# Patient Record
Sex: FEMALE | Race: BLACK OR AFRICAN AMERICAN | NOT HISPANIC OR LATINO | Employment: FULL TIME | URBAN - METROPOLITAN AREA
[De-identification: names, ages, dates, MRNs, and addresses within clinical notes are randomized per-mention and may not be internally consistent; named-entity substitution may affect disease eponyms.]

---

## 2017-01-03 ENCOUNTER — GENERIC CONVERSION - ENCOUNTER (OUTPATIENT)
Dept: OTHER | Facility: OTHER | Age: 27
End: 2017-01-03

## 2017-01-03 ENCOUNTER — APPOINTMENT (OUTPATIENT)
Dept: PERINATAL CARE | Facility: CLINIC | Age: 27
End: 2017-01-03
Payer: COMMERCIAL

## 2017-01-03 ENCOUNTER — ALLSCRIPTS OFFICE VISIT (OUTPATIENT)
Dept: PERINATAL CARE | Facility: CLINIC | Age: 27
End: 2017-01-03
Payer: COMMERCIAL

## 2017-01-03 PROCEDURE — 76815 OB US LIMITED FETUS(S): CPT | Performed by: OBSTETRICS & GYNECOLOGY

## 2017-01-03 PROCEDURE — 59025 FETAL NON-STRESS TEST: CPT | Performed by: OBSTETRICS & GYNECOLOGY

## 2017-01-06 ENCOUNTER — ALLSCRIPTS OFFICE VISIT (OUTPATIENT)
Dept: OTHER | Facility: OTHER | Age: 27
End: 2017-01-06

## 2017-01-09 ENCOUNTER — GENERIC CONVERSION - ENCOUNTER (OUTPATIENT)
Dept: OTHER | Facility: OTHER | Age: 27
End: 2017-01-09

## 2017-01-09 ENCOUNTER — ALLSCRIPTS OFFICE VISIT (OUTPATIENT)
Dept: PERINATAL CARE | Facility: CLINIC | Age: 27
End: 2017-01-09
Payer: COMMERCIAL

## 2017-01-09 PROCEDURE — 76815 OB US LIMITED FETUS(S): CPT | Performed by: OBSTETRICS & GYNECOLOGY

## 2017-01-09 PROCEDURE — 59025 FETAL NON-STRESS TEST: CPT | Performed by: OBSTETRICS & GYNECOLOGY

## 2017-01-13 ENCOUNTER — ALLSCRIPTS OFFICE VISIT (OUTPATIENT)
Dept: OTHER | Facility: OTHER | Age: 27
End: 2017-01-13

## 2017-01-17 ENCOUNTER — ALLSCRIPTS OFFICE VISIT (OUTPATIENT)
Dept: PERINATAL CARE | Facility: CLINIC | Age: 27
End: 2017-01-17
Payer: COMMERCIAL

## 2017-01-17 ENCOUNTER — GENERIC CONVERSION - ENCOUNTER (OUTPATIENT)
Dept: OTHER | Facility: OTHER | Age: 27
End: 2017-01-17

## 2017-01-17 PROCEDURE — 59025 FETAL NON-STRESS TEST: CPT | Performed by: OBSTETRICS & GYNECOLOGY

## 2017-01-17 PROCEDURE — 76815 OB US LIMITED FETUS(S): CPT | Performed by: OBSTETRICS & GYNECOLOGY

## 2017-01-20 ENCOUNTER — ALLSCRIPTS OFFICE VISIT (OUTPATIENT)
Dept: OTHER | Facility: OTHER | Age: 27
End: 2017-01-20

## 2017-01-24 ENCOUNTER — APPOINTMENT (OUTPATIENT)
Dept: PERINATAL CARE | Facility: CLINIC | Age: 27
End: 2017-01-24
Payer: COMMERCIAL

## 2017-01-24 ENCOUNTER — ALLSCRIPTS OFFICE VISIT (OUTPATIENT)
Dept: PERINATAL CARE | Facility: CLINIC | Age: 27
End: 2017-01-24
Payer: COMMERCIAL

## 2017-01-24 ENCOUNTER — GENERIC CONVERSION - ENCOUNTER (OUTPATIENT)
Dept: OTHER | Facility: OTHER | Age: 27
End: 2017-01-24

## 2017-01-24 PROCEDURE — 76815 OB US LIMITED FETUS(S): CPT | Performed by: OBSTETRICS & GYNECOLOGY

## 2017-01-24 PROCEDURE — 59025 FETAL NON-STRESS TEST: CPT | Performed by: OBSTETRICS & GYNECOLOGY

## 2017-01-27 ENCOUNTER — ALLSCRIPTS OFFICE VISIT (OUTPATIENT)
Dept: PERINATAL CARE | Facility: CLINIC | Age: 27
End: 2017-01-27
Payer: COMMERCIAL

## 2017-01-27 ENCOUNTER — GENERIC CONVERSION - ENCOUNTER (OUTPATIENT)
Dept: OTHER | Facility: OTHER | Age: 27
End: 2017-01-27

## 2017-01-27 ENCOUNTER — LAB REQUISITION (OUTPATIENT)
Dept: LAB | Facility: HOSPITAL | Age: 27
End: 2017-01-27
Payer: COMMERCIAL

## 2017-01-27 ENCOUNTER — ALLSCRIPTS OFFICE VISIT (OUTPATIENT)
Dept: OTHER | Facility: OTHER | Age: 27
End: 2017-01-27

## 2017-01-27 DIAGNOSIS — O24.414 INSULIN CONTROLLED GESTATIONAL DIABETES MELLITUS DURING PREGNANCY: ICD-10-CM

## 2017-01-27 DIAGNOSIS — Z34.90 ENCOUNTER FOR SUPERVISION OF NORMAL PREGNANCY: ICD-10-CM

## 2017-01-27 DIAGNOSIS — O09.293 SUPERVISION OF PREGNANCY WITH OTHER POOR REPRODUCTIVE OR OBSTETRIC HISTORY, THIRD TRIMESTER: ICD-10-CM

## 2017-01-27 PROCEDURE — 87653 STREP B DNA AMP PROBE: CPT | Performed by: OBSTETRICS & GYNECOLOGY

## 2017-01-27 PROCEDURE — 76816 OB US FOLLOW-UP PER FETUS: CPT | Performed by: OBSTETRICS & GYNECOLOGY

## 2017-01-29 LAB — GP B STREP DNA SPEC QL NAA+PROBE: NORMAL

## 2017-01-31 ENCOUNTER — GENERIC CONVERSION - ENCOUNTER (OUTPATIENT)
Dept: OTHER | Facility: OTHER | Age: 27
End: 2017-01-31

## 2017-01-31 ENCOUNTER — ALLSCRIPTS OFFICE VISIT (OUTPATIENT)
Dept: PERINATAL CARE | Facility: CLINIC | Age: 27
End: 2017-01-31
Payer: COMMERCIAL

## 2017-01-31 PROCEDURE — 59025 FETAL NON-STRESS TEST: CPT | Performed by: OBSTETRICS & GYNECOLOGY

## 2017-01-31 PROCEDURE — 76815 OB US LIMITED FETUS(S): CPT | Performed by: OBSTETRICS & GYNECOLOGY

## 2017-02-03 ENCOUNTER — ALLSCRIPTS OFFICE VISIT (OUTPATIENT)
Dept: OTHER | Facility: OTHER | Age: 27
End: 2017-02-03

## 2017-02-07 ENCOUNTER — ALLSCRIPTS OFFICE VISIT (OUTPATIENT)
Dept: PERINATAL CARE | Facility: CLINIC | Age: 27
End: 2017-02-07
Payer: COMMERCIAL

## 2017-02-07 ENCOUNTER — GENERIC CONVERSION - ENCOUNTER (OUTPATIENT)
Dept: OTHER | Facility: OTHER | Age: 27
End: 2017-02-07

## 2017-02-07 PROCEDURE — 59025 FETAL NON-STRESS TEST: CPT | Performed by: OBSTETRICS & GYNECOLOGY

## 2017-02-07 PROCEDURE — 76815 OB US LIMITED FETUS(S): CPT | Performed by: OBSTETRICS & GYNECOLOGY

## 2017-02-10 ENCOUNTER — ALLSCRIPTS OFFICE VISIT (OUTPATIENT)
Dept: OTHER | Facility: OTHER | Age: 27
End: 2017-02-10

## 2017-02-11 ENCOUNTER — ANESTHESIA EVENT (INPATIENT)
Dept: LABOR AND DELIVERY | Facility: HOSPITAL | Age: 27
End: 2017-02-11
Payer: COMMERCIAL

## 2017-02-11 ENCOUNTER — ANESTHESIA (INPATIENT)
Dept: LABOR AND DELIVERY | Facility: HOSPITAL | Age: 27
End: 2017-02-11
Payer: COMMERCIAL

## 2017-02-11 ENCOUNTER — HOSPITAL ENCOUNTER (INPATIENT)
Facility: HOSPITAL | Age: 27
LOS: 2 days | Discharge: HOME | End: 2017-02-13
Attending: OBSTETRICS & GYNECOLOGY | Admitting: OBSTETRICS & GYNECOLOGY
Payer: COMMERCIAL

## 2017-02-11 DIAGNOSIS — Z3A.38 38 WEEKS GESTATION OF PREGNANCY: Primary | ICD-10-CM

## 2017-02-11 LAB
ABO GROUP BLD: NORMAL
BASE EXCESS BLDCOA CALC-SCNC: -4.6 MMOL/L (ref 3–11)
BASE EXCESS BLDCOV CALC-SCNC: -3.9 MMOL/L (ref 1–9)
BLD GP AB SCN SERPL QL: NEGATIVE
ERYTHROCYTE [DISTWIDTH] IN BLOOD BY AUTOMATED COUNT: 15.6 % (ref 11.6–15.1)
GLUCOSE SERPL-MCNC: 62 MG/DL (ref 65–140)
GLUCOSE SERPL-MCNC: 66 MG/DL (ref 65–140)
GLUCOSE SERPL-MCNC: 72 MG/DL (ref 65–140)
GLUCOSE SERPL-MCNC: 80 MG/DL (ref 65–140)
HCO3 BLDCOA-SCNC: 21.8 MMOL/L (ref 17.3–27.3)
HCO3 BLDCOV-SCNC: 21.2 MMOL/L (ref 12.2–28.6)
HCT VFR BLD AUTO: 34.6 % (ref 34.8–46.1)
HGB BLD-MCNC: 11.1 G/DL (ref 11.5–15.4)
HIV 1+2 AB+HIV1 P24 AG SERPL QL IA: NORMAL
HIV1 P24 AG SER QL: NORMAL
MCH RBC QN AUTO: 23.7 PG (ref 26.8–34.3)
MCHC RBC AUTO-ENTMCNC: 32.1 G/DL (ref 31.4–37.4)
MCV RBC AUTO: 74 FL (ref 82–98)
O2 CT VFR BLDCOA CALC: 10 ML/DL
OXYHGB MFR BLDCOA: 41.9 %
OXYHGB MFR BLDCOV: 71.5 %
PCO2 BLDCOA: 44.7 MM[HG] (ref 30–60)
PCO2 BLDCOV: 38.8 MM HG (ref 27–43)
PH BLDCOA: 7.31 [PH] (ref 7.23–7.43)
PH BLDCOV: 7.36 [PH] (ref 7.19–7.49)
PLATELET # BLD AUTO: 250 THOUSANDS/UL (ref 149–390)
PMV BLD AUTO: 11.7 FL (ref 8.9–12.7)
PO2 BLDCOA: 19.3 MM HG (ref 5–25)
PO2 BLDCOV: 30.4 MM HG (ref 15–45)
RBC # BLD AUTO: 4.69 MILLION/UL (ref 3.81–5.12)
RH BLD: POSITIVE
SAO2 % BLDCOV: 17.2 ML/DL
WBC # BLD AUTO: 5.51 THOUSAND/UL (ref 4.31–10.16)

## 2017-02-11 PROCEDURE — 86803 HEPATITIS C AB TEST: CPT | Performed by: STUDENT IN AN ORGANIZED HEALTH CARE EDUCATION/TRAINING PROGRAM

## 2017-02-11 PROCEDURE — 86901 BLOOD TYPING SEROLOGIC RH(D): CPT | Performed by: OBSTETRICS & GYNECOLOGY

## 2017-02-11 PROCEDURE — 87340 HEPATITIS B SURFACE AG IA: CPT | Performed by: STUDENT IN AN ORGANIZED HEALTH CARE EDUCATION/TRAINING PROGRAM

## 2017-02-11 PROCEDURE — 85027 COMPLETE CBC AUTOMATED: CPT | Performed by: OBSTETRICS & GYNECOLOGY

## 2017-02-11 PROCEDURE — 86850 RBC ANTIBODY SCREEN: CPT | Performed by: OBSTETRICS & GYNECOLOGY

## 2017-02-11 PROCEDURE — 86592 SYPHILIS TEST NON-TREP QUAL: CPT | Performed by: OBSTETRICS & GYNECOLOGY

## 2017-02-11 PROCEDURE — 86900 BLOOD TYPING SEROLOGIC ABO: CPT | Performed by: OBSTETRICS & GYNECOLOGY

## 2017-02-11 PROCEDURE — 10907ZC DRAINAGE OF AMNIOTIC FLUID, THERAPEUTIC FROM PRODUCTS OF CONCEPTION, VIA NATURAL OR ARTIFICIAL OPENING: ICD-10-PCS | Performed by: OBSTETRICS & GYNECOLOGY

## 2017-02-11 PROCEDURE — 87806 HIV AG W/HIV1&2 ANTB W/OPTIC: CPT | Performed by: STUDENT IN AN ORGANIZED HEALTH CARE EDUCATION/TRAINING PROGRAM

## 2017-02-11 PROCEDURE — 82805 BLOOD GASES W/O2 SATURATION: CPT | Performed by: OBSTETRICS & GYNECOLOGY

## 2017-02-11 PROCEDURE — 99214 OFFICE O/P EST MOD 30 MIN: CPT

## 2017-02-11 PROCEDURE — 82948 REAGENT STRIP/BLOOD GLUCOSE: CPT

## 2017-02-11 PROCEDURE — 3E033VJ INTRODUCTION OF OTHER HORMONE INTO PERIPHERAL VEIN, PERCUTANEOUS APPROACH: ICD-10-PCS | Performed by: OBSTETRICS & GYNECOLOGY

## 2017-02-11 RX ORDER — CALCIUM CARBONATE 200(500)MG
1000 TABLET,CHEWABLE ORAL DAILY PRN
Status: DISCONTINUED | OUTPATIENT
Start: 2017-02-11 | End: 2017-02-13 | Stop reason: HOSPADM

## 2017-02-11 RX ORDER — SENNOSIDES 8.6 MG
1 TABLET ORAL
Status: DISCONTINUED | OUTPATIENT
Start: 2017-02-11 | End: 2017-02-13 | Stop reason: HOSPADM

## 2017-02-11 RX ORDER — ACETAMINOPHEN 325 MG/1
650 TABLET ORAL EVERY 4 HOURS PRN
Status: DISCONTINUED | OUTPATIENT
Start: 2017-02-11 | End: 2017-02-13 | Stop reason: HOSPADM

## 2017-02-11 RX ORDER — SODIUM CHLORIDE 9 MG/ML
125 INJECTION, SOLUTION INTRAVENOUS CONTINUOUS
Status: DISCONTINUED | OUTPATIENT
Start: 2017-02-11 | End: 2017-02-13 | Stop reason: HOSPADM

## 2017-02-11 RX ORDER — OXYTOCIN/RINGER'S LACTATE 30/500 ML
1-30 PLASTIC BAG, INJECTION (ML) INTRAVENOUS
Status: DISCONTINUED | OUTPATIENT
Start: 2017-02-11 | End: 2017-02-13 | Stop reason: HOSPADM

## 2017-02-11 RX ORDER — IBUPROFEN 600 MG/1
600 TABLET ORAL EVERY 6 HOURS PRN
Status: DISCONTINUED | OUTPATIENT
Start: 2017-02-11 | End: 2017-02-13 | Stop reason: HOSPADM

## 2017-02-11 RX ORDER — ROPIVACAINE HYDROCHLORIDE 2 MG/ML
INJECTION, SOLUTION EPIDURAL; INFILTRATION; PERINEURAL AS NEEDED
Status: DISCONTINUED | OUTPATIENT
Start: 2017-02-11 | End: 2017-02-12 | Stop reason: SURG

## 2017-02-11 RX ORDER — DOCUSATE SODIUM 100 MG/1
100 CAPSULE, LIQUID FILLED ORAL 2 TIMES DAILY PRN
Status: DISCONTINUED | OUTPATIENT
Start: 2017-02-11 | End: 2017-02-13 | Stop reason: HOSPADM

## 2017-02-11 RX ORDER — LIDOCAINE HYDROCHLORIDE AND EPINEPHRINE 15; 5 MG/ML; UG/ML
INJECTION, SOLUTION EPIDURAL AS NEEDED
Status: DISCONTINUED | OUTPATIENT
Start: 2017-02-11 | End: 2017-02-12 | Stop reason: SURG

## 2017-02-11 RX ORDER — OXYCODONE HYDROCHLORIDE AND ACETAMINOPHEN 5; 325 MG/1; MG/1
2 TABLET ORAL EVERY 4 HOURS PRN
Status: DISCONTINUED | OUTPATIENT
Start: 2017-02-11 | End: 2017-02-13 | Stop reason: HOSPADM

## 2017-02-11 RX ORDER — OXYCODONE HYDROCHLORIDE AND ACETAMINOPHEN 5; 325 MG/1; MG/1
1 TABLET ORAL EVERY 4 HOURS PRN
Status: DISCONTINUED | OUTPATIENT
Start: 2017-02-11 | End: 2017-02-13 | Stop reason: HOSPADM

## 2017-02-11 RX ORDER — ONDANSETRON 2 MG/ML
4 INJECTION INTRAMUSCULAR; INTRAVENOUS EVERY 8 HOURS PRN
Status: DISCONTINUED | OUTPATIENT
Start: 2017-02-11 | End: 2017-02-13 | Stop reason: HOSPADM

## 2017-02-11 RX ORDER — ACETAMINOPHEN 325 MG/1
650 TABLET ORAL EVERY 6 HOURS PRN
Status: DISCONTINUED | OUTPATIENT
Start: 2017-02-11 | End: 2017-02-13 | Stop reason: HOSPADM

## 2017-02-11 RX ORDER — INSULIN GLARGINE 100 [IU]/ML
36 INJECTION, SOLUTION SUBCUTANEOUS
Status: DISCONTINUED | OUTPATIENT
Start: 2017-02-11 | End: 2017-02-11

## 2017-02-11 RX ORDER — SIMETHICONE 80 MG
80 TABLET,CHEWABLE ORAL 4 TIMES DAILY PRN
Status: DISCONTINUED | OUTPATIENT
Start: 2017-02-11 | End: 2017-02-13 | Stop reason: HOSPADM

## 2017-02-11 RX ORDER — INSULIN GLARGINE 100 [IU]/ML
36 INJECTION, SOLUTION SUBCUTANEOUS
COMMUNITY
End: 2017-02-13 | Stop reason: HOSPADM

## 2017-02-11 RX ORDER — DIAPER,BRIEF,INFANT-TODD,DISP
1 EACH MISCELLANEOUS AS NEEDED
Status: DISCONTINUED | OUTPATIENT
Start: 2017-02-11 | End: 2017-02-13 | Stop reason: HOSPADM

## 2017-02-11 RX ORDER — DIPHENHYDRAMINE HCL 25 MG
25 TABLET ORAL EVERY 6 HOURS PRN
Status: DISCONTINUED | OUTPATIENT
Start: 2017-02-11 | End: 2017-02-13 | Stop reason: HOSPADM

## 2017-02-11 RX ORDER — ROPIVACAINE HYDROCHLORIDE 2 MG/ML
INJECTION, SOLUTION EPIDURAL; INFILTRATION; PERINEURAL CONTINUOUS PRN
Status: DISCONTINUED | OUTPATIENT
Start: 2017-02-11 | End: 2017-02-12 | Stop reason: SURG

## 2017-02-11 RX ORDER — SODIUM CHLORIDE, SODIUM LACTATE, POTASSIUM CHLORIDE, CALCIUM CHLORIDE 600; 310; 30; 20 MG/100ML; MG/100ML; MG/100ML; MG/100ML
125 INJECTION, SOLUTION INTRAVENOUS CONTINUOUS
Status: DISCONTINUED | OUTPATIENT
Start: 2017-02-11 | End: 2017-02-11

## 2017-02-11 RX ADMIN — Medication 2 MILLI-UNITS/MIN: at 13:30

## 2017-02-11 RX ADMIN — OXYCODONE HYDROCHLORIDE AND ACETAMINOPHEN 1 TABLET: 5; 325 TABLET ORAL at 23:44

## 2017-02-11 RX ADMIN — IBUPROFEN 600 MG: 600 TABLET ORAL at 23:10

## 2017-02-11 RX ADMIN — SODIUM CHLORIDE 125 ML/HR: 0.9 INJECTION, SOLUTION INTRAVENOUS at 13:30

## 2017-02-11 RX ADMIN — ROPIVACAINE HYDROCHLORIDE 6 ML: 2 INJECTION, SOLUTION EPIDURAL; INFILTRATION at 15:33

## 2017-02-11 RX ADMIN — SODIUM CHLORIDE 125 ML/HR: 0.9 INJECTION, SOLUTION INTRAVENOUS at 19:26

## 2017-02-11 RX ADMIN — SODIUM CHLORIDE 125 ML/HR: 0.9 INJECTION, SOLUTION INTRAVENOUS at 15:18

## 2017-02-11 RX ADMIN — ROPIVACAINE HYDROCHLORIDE 10 ML/HR: 2 INJECTION, SOLUTION EPIDURAL; INFILTRATION at 15:35

## 2017-02-11 RX ADMIN — MISOPROSTOL 25 MCG: 100 TABLET ORAL at 09:33

## 2017-02-11 RX ADMIN — LIDOCAINE HYDROCHLORIDE AND EPINEPHRINE 3 ML: 15; 5 INJECTION, SOLUTION EPIDURAL at 15:28

## 2017-02-11 RX ADMIN — ROPIVACAINE HYDROCHLORIDE 6 ML: 2 INJECTION, SOLUTION EPIDURAL; INFILTRATION at 15:30

## 2017-02-12 LAB
GLUCOSE SERPL-MCNC: 64 MG/DL (ref 65–140)
HBV SURFACE AG SER QL: NORMAL
HCV AB SER QL: NORMAL

## 2017-02-12 RX ADMIN — DOCUSATE SODIUM 100 MG: 100 CAPSULE, LIQUID FILLED ORAL at 17:16

## 2017-02-12 RX ADMIN — WITCH HAZEL 1 PAD: 500 SOLUTION RECTAL; TOPICAL at 00:18

## 2017-02-12 RX ADMIN — BENZOCAINE AND MENTHOL: 20; .5 SPRAY TOPICAL at 00:18

## 2017-02-12 RX ADMIN — DOCUSATE SODIUM 100 MG: 100 CAPSULE, LIQUID FILLED ORAL at 09:35

## 2017-02-12 RX ADMIN — HYDROCORTISONE 1 APPLICATION: 10 CREAM TOPICAL at 00:18

## 2017-02-13 VITALS
RESPIRATION RATE: 20 BRPM | TEMPERATURE: 98.7 F | WEIGHT: 183 LBS | HEIGHT: 63 IN | BODY MASS INDEX: 32.43 KG/M2 | SYSTOLIC BLOOD PRESSURE: 107 MMHG | DIASTOLIC BLOOD PRESSURE: 65 MMHG | HEART RATE: 66 BPM

## 2017-02-13 LAB — RPR SER QL: NORMAL

## 2017-02-13 RX ORDER — IBUPROFEN 600 MG/1
600 TABLET ORAL EVERY 6 HOURS PRN
Qty: 30 TABLET | Refills: 0
Start: 2017-02-13 | End: 2018-05-11

## 2017-02-13 RX ORDER — CALCIUM CARBONATE 200(500)MG
1000 TABLET,CHEWABLE ORAL DAILY PRN
Refills: 0
Start: 2017-02-13 | End: 2017-03-15

## 2017-02-13 RX ORDER — ACETAMINOPHEN 325 MG/1
650 TABLET ORAL EVERY 4 HOURS PRN
Qty: 30 TABLET | Refills: 0
Start: 2017-02-13 | End: 2017-03-15

## 2017-02-13 RX ORDER — DOCUSATE SODIUM 100 MG/1
100 CAPSULE, LIQUID FILLED ORAL 2 TIMES DAILY PRN
Qty: 10 CAPSULE | Refills: 0
Start: 2017-02-13 | End: 2018-09-10 | Stop reason: SDUPTHER

## 2017-02-13 RX ORDER — SIMETHICONE 80 MG
80 TABLET,CHEWABLE ORAL 4 TIMES DAILY PRN
Qty: 30 TABLET | Refills: 0
Start: 2017-02-13 | End: 2017-03-15

## 2017-02-13 RX ADMIN — OXYCODONE HYDROCHLORIDE AND ACETAMINOPHEN 2 TABLET: 5; 325 TABLET ORAL at 08:08

## 2017-02-13 RX ADMIN — OXYCODONE HYDROCHLORIDE AND ACETAMINOPHEN 2 TABLET: 5; 325 TABLET ORAL at 12:05

## 2017-02-17 ENCOUNTER — GENERIC CONVERSION - ENCOUNTER (OUTPATIENT)
Dept: OTHER | Facility: OTHER | Age: 27
End: 2017-02-17

## 2017-02-22 LAB — PLACENTA IN STORAGE: NORMAL

## 2017-03-27 ENCOUNTER — ALLSCRIPTS OFFICE VISIT (OUTPATIENT)
Dept: OTHER | Facility: OTHER | Age: 27
End: 2017-03-27

## 2017-03-27 DIAGNOSIS — O09.293 SUPERVISION OF PREGNANCY WITH OTHER POOR REPRODUCTIVE OR OBSTETRIC HISTORY, THIRD TRIMESTER: ICD-10-CM

## 2017-03-27 DIAGNOSIS — O24.414 INSULIN CONTROLLED GESTATIONAL DIABETES MELLITUS DURING PREGNANCY: ICD-10-CM

## 2017-04-11 ENCOUNTER — APPOINTMENT (OUTPATIENT)
Dept: LAB | Facility: CLINIC | Age: 27
End: 2017-04-11
Payer: COMMERCIAL

## 2017-04-11 ENCOUNTER — TRANSCRIBE ORDERS (OUTPATIENT)
Dept: LAB | Facility: CLINIC | Age: 27
End: 2017-04-11

## 2017-04-11 DIAGNOSIS — O24.414 INSULIN CONTROLLED GESTATIONAL DIABETES MELLITUS DURING PREGNANCY: ICD-10-CM

## 2017-04-11 DIAGNOSIS — O09.293 SUPERVISION OF PREGNANCY WITH OTHER POOR REPRODUCTIVE OR OBSTETRIC HISTORY, THIRD TRIMESTER: ICD-10-CM

## 2017-04-11 LAB — GLUCOSE P FAST SERPL-MCNC: 83 MG/DL (ref 65–99)

## 2017-04-11 PROCEDURE — 36415 COLL VENOUS BLD VENIPUNCTURE: CPT

## 2017-04-11 PROCEDURE — 82947 ASSAY GLUCOSE BLOOD QUANT: CPT

## 2017-05-04 ENCOUNTER — GENERIC CONVERSION - ENCOUNTER (OUTPATIENT)
Dept: OTHER | Facility: OTHER | Age: 27
End: 2017-05-04

## 2017-05-12 DIAGNOSIS — K76.89 OTHER SPECIFIED DISORDERS OF LIVER: ICD-10-CM

## 2017-05-12 DIAGNOSIS — Z87.898 PERSONAL HISTORY OF OTHER SPECIFIED CONDITIONS: ICD-10-CM

## 2017-05-30 ENCOUNTER — ALLSCRIPTS OFFICE VISIT (OUTPATIENT)
Dept: OTHER | Facility: OTHER | Age: 27
End: 2017-05-30

## 2017-06-06 ENCOUNTER — ALLSCRIPTS OFFICE VISIT (OUTPATIENT)
Dept: OTHER | Facility: OTHER | Age: 27
End: 2017-06-06

## 2018-01-09 NOTE — PROGRESS NOTES
DEC 2 2016         RE: Ana Maria Jessica                                   To: StephanieCHELY Seay    MR#: [id number]                                    [facility]7 [facility]   : [address] [address]   ENC: [id number]                             [place], 100 [facility]   (Exam #: [id number])                           Fax: [phone]      The LMP of this 30year old,  G2, P0-0-0-0 patient was MAY 16 2016, giving   her an RAY of 2017 and a current gestational age of 35 weeks 4 days   by dates  A sonographic examination was performed on DEC 2 2016 using real   time equipment  The ultrasound examination was performed using abdominal   technique  The patient has a BMI of 34 9  Her blood pressure today was   116/65  Earliest ultrasound found in her record; MFM scan 16 8w3d 17 RAY      Problem list:   1  Prior 40+ week stillbirth   2  Gestational diabetes      Jd has no complaints today  She reports regular fetal movements and   denies problems related to hypertension,  labor, or vaginal   bleeding  Agustin Lindsey continues to take 81 mg of aspirin a day, as previously   suggested  A diagnostic three-hour glucose tolerance test performed   earlier today revealed 2 out of 4 abnormal values, consistent with a   diagnosis of gestational diabetes        Cardiac motion was observed at 139 bpm       INDICATIONS      prior  IUFD   obesity      Exam Types      Level I      RESULTS      Fetus # 1 of 1   Vertex presentation   Fetal growth appeared normal   Placenta Location = Anterior   No placenta previa   Placenta Grade = I      MEASUREMENTS (* Included In Average GA)      AC              23 4 cm        27 weeks 4 days* (29%)   BPD              7 0 cm        28 weeks 0 days* (30%)   HC              25 6 cm        27 weeks 4 days* (15%)   Femur            5 2 cm        27 weeks 6 days* (27%)      Cerebellum       3 4 cm        29 weeks 6 days   CisternaMagna 3 5 mm      HC/AC           1 09   FL/AC           0 22   FL/BPD          0 75   EFW (Ac/Fl/Hc)  1123 grams - 2 lbs 8 oz                 (35%)      THE AVERAGE GESTATIONAL AGE is 27 weeks 6 days +/- 14 days  AMNIOTIC FLUID      Q1: 4 3      Q2: 3 8      Q3: 5 4      Q4: 4 3   GARETH Total = 17 7 cm   Amniotic Fluid: Normal      ANATOMY DETAILS      Visualized Appearing Sonographically Normal:   HEAD: (Calvarium, BPD Level, Cavum, Lateral Ventricles, Choroid Plexus,   Cerebellum, Cisterna Magna);    FACE/NECK: (Profile, Nose/Lips);    TH    CAV  : (Lungs, Diaphragm); HEART: (Four Chamber View, Proximal Left   Outflow, Proximal Right Outflow, 3 Vessel Trachea, Short Axis of Greater   Vessels, Interventricular Septum, Interatrial Septum, Cardiac Axis,   Cardiac Position);    ABD  CAV : (Liver);    STOMACH, RIGHT KIDNEY, LEFT   KIDNEY, BLADDER, PLACENTA      IMPRESSION      Haile IUP   27 weeks and 6 days by this ultrasound  (RAY=FEB 25 2017)   Vertex presentation   Fetal growth appeared normal   Regular fetal heart rate of 139 bpm   Anterior placenta   No placenta previa      GENERAL COMMENT      No fetal structural abnormality is identified on the Level I survey today  Fetal interval growth and amniotic fluid volume are normal       Today's ultrasound findings, glucose tolerance test results, and suggested   follow-up were discussed in detail with Jd  She had initial intake   with the Diabetes and Pregnancy program in the [address] [place] [address] following   her [facility] evaluation  She will begin home glucose monitoring 4 times per   day  Repeat fetal growth assessment will be performed in 4 weeks  Twice   per week fetal nonstress testing is recommended for additional pregnancy   surveillance beginning at 32 weeks gestation given her history of a prior   term stillbirth  Daily third trimester fetal kick counting was discussed   at the visit today   Laura Martinez should maintain contact with the Diabetes and   Pregnancy program in the [facility]  at least once a week for review   of her blood glucose values  Delivery is recommended at 39 weeks   gestation, sooner if otherwise clinically indicated, given her history of   a prior term stillbirth  The diagnosis of gestational diabetes during this pregnancy is associated   with an increased risk for the development of overt, Type II diabetes   later in Maygen's life  Her risk for developing Type II diabetes is as   high as 50%  A 75 gram, two-hour, OGTT is suggested as initial follow up   6-12 weeks following delivery  The face to face time, in addition to time spent discussing ultrasound   results, was approximately 15 minutes, greater than 50% of which was spent   during counseling and coordination of care  KRYSTAL Miller M D  Maternal-Fetal Medicine   Electronically signed 12/02/16 16:00           Electronically signed by:Naomy MO    Dec  8 2016  4:51PM EST Review

## 2018-01-09 NOTE — MISCELLANEOUS
Message   Recorded as Task   Date: 05/04/2017 10:31 AM, Created By: Yanely Barbosa   Task Name: Financial Authorization   Assigned To: [facility],Team   Regarding Patient: Christoph Mckoy, Status: Active   CommentMallory Herman - 04 May 2017 10:31 AM     TASK CREATED  {other task closed}    Patient's last MRCP was 5/6/2016  Recommended follow up was 6 - 12 months  I spoke to patient in October  She would rather wait the 12 months  Authorized MRI abdomen through [facility]  #[id number]  Valid 5/4/2017 - 7/3/2017  Mailed script to patients home address (updated dx codes and added with Liver Protocol and Evoist contrast)  Will contact patient when results are received and verified by Dr Kiran Liu  Active Problems    1  6 weeks postpartum follow-up (V24 2) (Z39 2)   2  Contraceptive management (V25 9) (Z30 9)   3  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 8) (K76 89)   4  History of shoulder dystocia in prior pregnancy (V13 29) (Z87 59)   5  History of stillbirth in pregnant patient in third trimester, antepartum (V23 5) (O09 293)   6  Insulin controlled gestational diabetes mellitus (GDM) in third trimester (648 83)   (O24 414)    Current Meds   1  Bibiana 0 35 MG Oral Tablet; TAKE 1 TABLET DAILY; Therapy: [id number] to [id number])  Requested for: 14MJG1209; Last   Rx:27Mar2017 Ordered    Allergies    1  No Known Drug Allergies    2  No Known Environmental Allergies   3   No Known Food Allergies    Signatures   Electronically signed by : Haritha Sorto, ; May  4 2017 10:32AM EST                       (Author)

## 2018-01-10 NOTE — MISCELLANEOUS
Message      Date: 22 Apr 2016 11:26 AM EST, Recorded By: Ezekiel Eaton For: Rachael Choi   Caller: Reba Chaudhry Polo   Phone: [phone] (Home)   Patient called the office reporting that she had a 10 minute episode of pain in the right ribs up into her upper back  She has not had any of these pain episodes post op cholecystectomy from 2/23/16  At the time of the call, the pain resolved  She denied any associated dyspnea, nausea/vomiting, fever or jaundice  Her stools are formed and brown color  She will call and keep up updated with her condition  I advised that she call to schedule an appointment if this pain reoccurs  She verbalized that she is comfortable with this plan  Active Problems    1  Change in bowel habits (787 99) (R19 4)   2  Constipation (564 00) (K59 00)   3  Hematochezia (578 1) (K92 1)   4  Procreative management counseling (V26 49) (Z31 69)   5  Well female exam with routine gynecological exam (V72 31) (Z01 419)    Current Meds   1  No Reported Medications  Requested for: 07IAD9882 Recorded    Allergies    1   No Known Drug Allergies    Signatures   Electronically signed by : Carlton Sarah, ; Apr 22 2016 11:46AM EST                       (Author)

## 2018-01-12 VITALS
RESPIRATION RATE: 17 BRPM | DIASTOLIC BLOOD PRESSURE: 78 MMHG | TEMPERATURE: 97.7 F | OXYGEN SATURATION: 98 % | HEIGHT: 62 IN | HEART RATE: 81 BPM | SYSTOLIC BLOOD PRESSURE: 116 MMHG | BODY MASS INDEX: 31.92 KG/M2 | WEIGHT: 173.44 LBS

## 2018-01-12 VITALS
SYSTOLIC BLOOD PRESSURE: 102 MMHG | DIASTOLIC BLOOD PRESSURE: 56 MMHG | HEIGHT: 62 IN | BODY MASS INDEX: 34.6 KG/M2 | WEIGHT: 188 LBS

## 2018-01-12 VITALS
SYSTOLIC BLOOD PRESSURE: 104 MMHG | WEIGHT: 183.2 LBS | BODY MASS INDEX: 33.71 KG/M2 | DIASTOLIC BLOOD PRESSURE: 59 MMHG | HEIGHT: 62 IN

## 2018-01-12 NOTE — PROGRESS NOTES
MARTIN 3 2017         RE: Mariaelena Cade                                   To: CHELY Dangelo    MR#: [id number]                                    [facility]7 [facility]   : [address] [place] [address]   ENC: [id number]                             [place], 100 [facility]   (Exam #: [id number])                           Fax: [phone]      The LMP of this 27year old,  G2, P0-0-0-0 patient was MAY 16 2016, giving   her an RAY of 2017 and a current gestational age of 29 weeks 1 day   by dates  A sonographic examination was performed on MARTIN 3 2017 using real   time equipment  The ultrasound examination was performed using abdominal   technique  The patient has a BMI of 34 6  Her blood pressure today was   112/62  Earliest ultrasound found in her record; MFM scan 16 8w3d 17 RAY      Cardiac motion was observed at 142 bpm       INDICATIONS      diabetes, gestational   previous stillbirth      Exam Types      Amniotic Fluid Index   NST      RESULTS      Fetus # 1 of 1   Breech presentation   No placenta previa   Placenta Grade = II      The NST was reactive with no decelerations  AMNIOTIC FLUID      Q1: 3 3      Q2: 3 2      Q3: 5 2      Q4: 3 6   GARETH Total = 15 3 cm   Amniotic Fluid: Normal      IMPRESSION      Haile IUP   Breech presentation   Regular fetal heart rate of 142 bpm   No placenta previa      RECOMMENDATION      GARETH: 1 Week   NST: 2X per week      KRYSTAL Lerner M D     Maternal-Fetal Medicine   Electronically signed 17 17:57

## 2018-01-12 NOTE — PROGRESS NOTES
2017         RE: Zion Pass                                   To: CHELY Aquino O    MR#: [id number]                                    [facility]7 [facility]   : [address]   ENC: [id number]:SAYRA SALVADOR, 100 [facility]   (Exam #: [id number])                          Fax: [phone]      The LMP of this 27year old,  G2, P0-0-0-0 patient was MAY 16 2016, giving   her an RAY of 2017 and a current gestational age of 42 weeks 1 day   by dates  A sonographic examination was performed on 2017 using   real time equipment  The ultrasound examination was performed using   abdominal technique  The patient has a BMI of 34 0  Her blood pressure   today was 106/57  Earliest ultrasound found in her record; MFM scan 16 8w3d 17 RAY      Problem list:   1  History of a 40 week five-day stillbirth that had a shoulder dystocia   at delivery  Baby weighed 4315 g per the delivery note   2  GDM a 2 on Lantus   3  Declined genetics, a flu shot and DTap   4  History of an indeterminant lupus anticoagulant found during the workup   of her demise  Cardiac motion was observed at 141 bpm       INDICATIONS      diabetes, gestational   previous stillbirth      Exam Types      Amniotic Fluid Index      RESULTS      Fetus # 1 of 1   Vertex presentation   Placenta Location = Anterior   No placenta previa   Placenta Grade = II      The NST was reactive with no decelerations  AMNIOTIC FLUID      Q1: 3 7      Q2: 7 4      Q3: 2 0      Q4: 4 2   GARETH Total = 17 3 cm   Amniotic Fluid: Normal      IMPRESSION      Haile IUP   Vertex presentation   Regular fetal heart rate of 141 bpm   Anterior placenta   No placenta previa      GENERAL COMMENT      Her follow up care should include twice weekly NST's and a once weekly   amniotic fluid assessment, along with her daily kick counts   Recommend a   repeat lupus anticoagulant now that she is no longer on baby aspirin  This   can also be drawn at the time of her induction  KRYSTAL Philip M D     Maternal-Fetal Medicine   Electronically signed 01/24/17 21:02           Electronically signed by:Meli Yates DO  Jan 25 7850 80:50RW EST

## 2018-01-12 NOTE — PROGRESS NOTES
2017         RE: Adalid Ryan                                   To: Johny Michelle CHELY KAMILA    MR#: [id number]                                    [facility]7 [facility]   : [address]   ENC: [id number]:ROSITA SALVADOR, 100 [facility]   (Exam #: [id number])                          Fax: [phone]      The LMP of this 27year old,  G2, P0-0-0-0 patient was MAY 16 2016, giving   her an RAY of 2017 and a current gestational age of 42 weeks 1 day   by dates  A sonographic examination was performed on 2017 using   real time equipment  The ultrasound examination was performed using   abdominal technique  The patient has a BMI of 33 7  Her blood pressure   today was 106/58  Earliest ultrasound found in her record; MFM scan 16 8w3d 17 RAY      Problem list:   1  History of a 40 week five-day stillbirth that had a shoulder dystocia   at delivery  Baby weighed 4315 g per the delivery note   2  GDM  - on Lantus         Cardiac motion was observed at 136 bpm       INDICATIONS      diabetes, gestational   previous stillbirth      Exam Types      Amniotic Fluid Index      RESULTS      Fetus # 1 of 1   Vertex presentation   Placenta Location = Anterior   No placenta previa   Placenta Grade = II      AMNIOTIC FLUID      Q1: 4 3      Q2: 2 6      Q3: 4 5      Q4: 3 8   GARETH Total = 15 2 cm   Amniotic Fluid: Normal      IMPRESSION      Haile IUP   Vertex presentation   Regular fetal heart rate of 136 bpm   Anterior placenta   No placenta previa      GENERAL COMMENT      The patient presented today for antepartum fetal surveillance secondary to   previous stillbirth  She had a modified biophysical profile, which   includes an NST and evaluation of the amniotic fluid  The NST was   reactive and reassuring  The amniotic fluid index was 15 2 cm, which is   normal for gestational age        Recommend continued twice weekly fetal testing secondary to previous   stillbirth  Thank very much for allowing us to participate in the care of your   patient  Should you have any questions, please do not hesitate to contact   our office  KRYSTAL Fuentes , R CHELY SAHU S  BRANDY Robin     Electronically signed 01/31/17 22:18           Electronically signed by:Meli Sultana DO  Feb  1 0884  8:31AM EST

## 2018-01-13 VITALS
WEIGHT: 189.4 LBS | HEIGHT: 62 IN | BODY MASS INDEX: 34.85 KG/M2 | SYSTOLIC BLOOD PRESSURE: 112 MMHG | DIASTOLIC BLOOD PRESSURE: 62 MMHG

## 2018-01-13 VITALS
WEIGHT: 183.05 LBS | DIASTOLIC BLOOD PRESSURE: 56 MMHG | SYSTOLIC BLOOD PRESSURE: 105 MMHG | BODY MASS INDEX: 32.43 KG/M2 | HEIGHT: 63 IN

## 2018-01-13 VITALS
WEIGHT: 182 LBS | BODY MASS INDEX: 33.49 KG/M2 | SYSTOLIC BLOOD PRESSURE: 98 MMHG | DIASTOLIC BLOOD PRESSURE: 64 MMHG | HEART RATE: 64 BPM | HEIGHT: 62 IN | RESPIRATION RATE: 16 BRPM

## 2018-01-13 VITALS
HEIGHT: 62 IN | WEIGHT: 182 LBS | HEART RATE: 74 BPM | SYSTOLIC BLOOD PRESSURE: 118 MMHG | DIASTOLIC BLOOD PRESSURE: 72 MMHG | BODY MASS INDEX: 33.49 KG/M2

## 2018-01-13 VITALS
HEIGHT: 62 IN | SYSTOLIC BLOOD PRESSURE: 106 MMHG | DIASTOLIC BLOOD PRESSURE: 57 MMHG | BODY MASS INDEX: 34.3 KG/M2 | WEIGHT: 186.4 LBS

## 2018-01-13 NOTE — PROGRESS NOTES
QID 2017         RE: Sydnee Limon                                   To: Sally Herrera CHELY TREVIÑO    MR#: [id number]                                    [address] [facility]   : [address]   ENC: [id number]                             [place], [address] [place] [address]   (Exam #: [id number])                          Fax: [phone]      The LMP of this 27year old,  G2, P0-0-0-0 patient was MAY 16 2016, giving   her an RAY of 2017 and a current gestational age of 42 weeks 4 days   by dates  A sonographic examination was performed on 2017 using   real time equipment  The ultrasound examination was performed using   abdominal technique  The patient has a BMI of 33 7  Her blood pressure   today was 105/56  Earliest ultrasound found in her record; MFM scan 16 8w3d 17 RAY      Problem list:   1  History of a 40 week five-day stillbirth that had a shoulder dystocia   at delivery  Baby weighed 4315 g per the delivery note   2  GDM  - on Lantus            Jd has no complaints today  She reports regular fetal movements and   denies problems related to hypertension,   labor, or vaginal   bleeding  She is currently treated with 32 units of Lantus at night  Margarita Ore had a non stress test in your office earlier today        Cardiac motion was observed at 146 bpm       INDICATIONS      diabetes, gestational   previous stillbirth      Exam Types      Level I      RESULTS      Fetus # 1 of 1   Vertex presentation   Fetal growth appeared normal   Placenta Location = Anterior   No placenta previa   Placenta Grade = II      MEASUREMENTS (* Included In Average GA)      AC              32 6 cm        36 weeks 5 days* (54%)   BPD              8 4 cm        33 weeks 4 days* (<5%)   HC              32 2 cm        35 weeks 6 days* (29%)   Femur            6 8 cm        34 weeks 5 days* (28%)      Cerebellum       5 3 cm        36 weeks 5 days HC/AC           0 99   FL/AC           0 21   FL/BPD          0 82   EFW (Ac/Fl/Hc)  2823 grams - 6 lbs 4 oz                 (38%)      THE AVERAGE GESTATIONAL AGE is 35 weeks 1 day +/- 21 days  AMNIOTIC FLUID      Q1: 3 8      Q2: 4 6      Q3: 3 2      Q4: 4 3   GARETH Total = 16 0 cm   Amniotic Fluid: Normal      ANATOMY DETAILS      Visualized Appearing Sonographically Normal:   HEAD: (Calvarium, BPD Level, Lateral Ventricles, Cerebellum);    TH  CAV :   (Diaphragm); HEART: (Four Chamber View, Proximal Left Outflow, Proximal   Right Outflow, Short Axis of Greater Vessels, Cardiac Axis);    STOMACH,   RIGHT KIDNEY, LEFT KIDNEY, BLADDER, PLACENTA      Not Visualized:   HEAD: (Cavum)      BIOPHYSICAL PROFILE      The Biophysical Profile score was 8/8  Breathin  Movement: 2  Tone: 2  AFV: 2      IMPRESSION      Haile IUP   35 weeks and 1 day by this ultrasound  (RAY=MAR 2 2017)   Vertex presentation   Fetal growth appeared normal   Regular fetal heart rate of 146 bpm   Anterior placenta   No placenta previa      GENERAL COMMENT      No fetal structural abnormality is identified on the Level I survey today  Fetal interval growth and amniotic fluid volume are normal       Today's ultrasound findings and suggested follow-up were discussed in   detail with Jd  Suggested follow-up fetal surveillance includes   continuation of twice per week NST's, weekly GARETH's, and daily kick counts  Marline Javier should continue to maintain contact with the Diabetes and Pregnancy   program in the [facility]  at least once a week for review of her   blood glucose values and adjustment of insulin doses  Delivery is   recommended at 39 weeks gestation, sooner if otherwise clinically   indicated  The face to face time, in addition to time spent discussing ultrasound   results, was approximately 10 minutes, greater than 50% of which was spent   during counseling and coordination of care        Cris Rocha R  CHELY NAVA S , R CHELY SAHU S  BRANDY Felder     Maternal-Fetal Medicine   Electronically signed 01/27/17 17:56           Electronically signed by:Meli Naik DO  Jan 30 7104 24:99TR EST

## 2018-01-13 NOTE — PROGRESS NOTES
OCT 7 2016         RE: Samaria Clemens                                   To: CHELY Schwartz    MR#: [id number]                                    [facility]   : [address] [place]   ENC: [id number]:SHANTI SALVADOR, [facility]   (Exam #: [id number])                           Fax: [phone]      The LMP of this 30year old,  G2, P0-0-0-0 patient was MAY 16 2016, giving   her an RAY of 2017 and a current gestational age of 25 weeks 4 days   by dates  A sonographic examination was performed on OCT 7 2016 using real   time equipment  The ultrasound examination was performed using abdominal &   vaginal techniques  The patient has a BMI of 34 6  Her blood pressure   today was 105/55  Earliest ultrasound found in her record; MFM scan 16 8w3d 17 RAY      Problem list:   1  Fetal demise at 40w5d  A small placenta <3% was found with a marginal   PCI  Minimal evidence for infarctions noted on < 5% of the placenta  No   karyotype was completed  Neg TORCH and Urine drug screen  No evidence for   diabetes  She declined genetic screening early in the pregnancy  EFW by US   prior to delivery showed a 3700gm fetus with ascites and pleural effusions   and anhydramnios  Fetal weight was 3601 gm at the time of the autopsy and   4315 gm at delivery  Maybe the discrepancy was due to the ascites and   pleural effusions noted on the US and which were removed during the   autopsy  Lupus anticoagulant was indeterminant  She is on baby aspirin  2  Shoulder dystocia noted at delivery [facility]  3  She declined genetic screening in this pregnancy        Cardiac motion was observed at 136 bpm       INDICATIONS      prior  IUFD   obesity   fetal anatomical survey   abnormal uterine Doppler      Exam Types      LEVEL II   Transvaginal      RESULTS      Fetus # 1 of 1   Vertex presentation   Fetal growth appeared normal   Placenta Location = Anterior   No placenta previa   Placenta Grade = I      MEASUREMENTS (* Included In Average GA)      AC              15 4 cm        20 weeks 2 days* (45%)   BPD              4 9 cm        20 weeks 6 days* (57%)   HC              18 4 cm        20 weeks 5 days* (50%)   Femur            3 4 cm        20 weeks 6 days* (48%)      Nuchal Fold      3 6 mm      Humerus          3 4 cm        21 weeks 5 days  (72%)   Radius           2 9 cm        22 weeks 2 days   Ulna             2 9 cm        20 weeks 4 days   Tibia            3 0 cm        20 weeks 6 days  (46%)   Fibula           3 0 cm        20 weeks 1 day   Foot             3 6 cm        21 weeks 2 days      Cerebellum       2 2 cm        21 weeks 5 days   CisternaMagna    3 5 mm      HC/AC           1 20   FL/AC           0 22   FL/BPD          0 69   EFW (Ac/Fl/Hc)   366 grams - 0 lbs 13 oz      THE AVERAGE GESTATIONAL AGE is 20 weeks 5 days +/- 10 days  AMNIOTIC FLUID         Largest Vertical Pocket = 5 2 cm   Amniotic Fluid: Normal      UTERINE ARTERIES                                  S/D   PI    RI    NOTCH       Left Uterine Artery              1 54         No       Right Uterine Artery             0 98         No      CERVICAL EVALUATION      SUPINE      Cervical Length: 4 80 cm      OTHER TEST RESULTS           Funneling?: No             Dynamic Changes?: No        Resp  To TFP?: No      ANATOMY      Head                                    Normal   Face/Neck                               Normal   Th  Cav  Normal   Heart                                   Normal   Abd  Cav  Normal   Stomach                                 Normal   Right Kidney                            Normal   Left Kidney                             Normal   Bladder                                 Normal   Abd   Wall                               Normal   Spine Normal   Extrems                                 Normal   Genitalia                               Normal   Placenta                                Normal   Umbl  Cord                              Normal   Uterus                                  Normal   PCI                                     Normal      ANATOMY DETAILS      Visualized Appearing Sonographically Normal:   HEAD: (Calvarium, BPD Level, Cavum, Lateral Ventricles, Choroid Plexus,   Cerebellum, Cisterna Magna);    FACE/NECK: (Neck, Nuchal Fold, Profile,   Orbits, Nose/Lips, Palate, Face);    TH  CAV  : (Lungs, Diaphragm); HEART: (Four Chamber View, Proximal Left Outflow, Proximal Right Outflow,   3 Vessel Trachea, Short Axis of Greater Vessels, Ductal Arch, Aortic Arch,   Interventricular Septum, Interatrial Septum, IVC, SVC, Cardiac Axis,   Cardiac Position);    ABD  CAV , STOMACH, RIGHT KIDNEY, LEFT KIDNEY,   BLADDER, ABD  WALL, SPINE: (Cervical Spine, Thoracic Spine, Lumbar Spine,   Sacrum);    EXTREMS: (Lt Humerus, Rt Humerus, Lt Forearm, Rt Forearm, Lt   Hand, Rt Hand, Lt Femur, Rt Femur, Lt Low Leg, Rt Low Leg, Lt Foot, Rt   Foot);    GENITALIA, PLACENTA, UMBL  CORD, UTERUS, PCI      ANATOMY COMMENTS      No fetal structural abnormality or ultrasound marker for aneuploidy is   identified on the Level II ultrasound study today  The maternal uterine   artery Doppler study is  normal   Active movement of the fetal body &   extremities was seen  There is no suspicion of a subchorionic bleed  The   placental cord insertion was normal  The anatomical survey is complete  ADNEXA      The left ovary appeared normal and measured 3 7 x 2 6 x 2 0 cm with a   volume of 10 1 cc  The right ovary appeared normal and measured 3 3 x 3 0   x 1 3 cm with a volume of 6 7 cc        IMPRESSION      Haile IUP   20 weeks and 5 days by this ultrasound  (RAY=FEB 19 2017)   Vertex presentation   Fetal growth appeared normal   Normal anatomy survey   Regular fetal heart rate of 136 bpm   Anterior placenta   No placenta previa      GENERAL COMMENT      The patient was informed of the findings and counseled about the   limitations of the exam in detecting all forms of fetal congenital   abnormalities  She denies any vaginal bleeding or uterine cramping/contractions  Follow up recommended:      1  Recommend a growth scan at 28 and 34 weeks  2  Recommend starting twice weekly nonstress tests and weekly fluid scans   from 32 weeks on    3  Can offer delivery any time after 39 weeks  4  Recommend a repeat lupus anticoagulant level after she is no longer on   baby aspirin  5  Her uterine dopplers have normalized by today  I will keep her on a   baby aspirin though secondary to her history of a prior stillbirth  She   can stop it at 34 weeks  6  She was counseled regarding and declined a flu shot today  She is aware   of the CDC's and ACOG's recommendation for pregnant women to get the flu   shot in any gestational age of pregnancy during the flu season  7  FOB diagnosed with herpes by a physican recently based on exam only  He   thinks he has had symptoms in past but has not been found to have formal   diagnosis of herpes on several occasions by "STD screening"  He is   concerned he gave this to [address]  and its affects on the baby  Her HSV   screen at the time of the demise in August of 2015 was negative  Will   rescreen by HSVI and II IgG and IgM today  The face to face time, in addition to time spent discussing ultrasound   results, was approximately 15 minutes, greater than 50% of which was spent   during counseling and coordination of care  KRYSTAL Lucero M D  Maternal-Fetal Medicine   Electronically signed 10/07/16 19:15           Electronically signed by:Naomy MO    Oct 11 2016  6:14PM EST Review

## 2018-01-13 NOTE — MISCELLANEOUS
Message  Pt contacted [facility] requesting NIPt testing at today's appt  Pt is low risk -contacted [address] [facility] pt current deductible for insurance is 1000 00 balance remaining and a coinsurance of 20%  Pt aware cost estimation would be $1075 00  Pt expressed she would like harmony testing due to friend only paying 1-200 00 for testing  I stressed to her every insurance company has different deductibles and coinsurances  She stated due to cost she wanted to cancel her appt  I explained to her appt  today is not solely bloodwork but an ultrasound as well  She seemed confused as to importance of ultrasound -i told her i will contact her ob to explain further-called Dr Nazario Pedersen office and they will have one of the physicians to contact her to discuss  Active Problems    1  Constipation (564 00) (K59 00)   2  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 8) (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 89)   3  Hematochezia (578 1) (K92 1)   4  Prior pregnancy with fetal demise and current pregnancy, unspecified trimester (V23 5)   (O09 299)   5  Screening for STDs (sexually transmitted diseases) (V74 5) (Z11 3)   6  Supervision of normal pregnancy (V22 1) (Z34 90)   7  Transaminitis (790 4) (R74 0)   8  Urinary frequency (788 41) (R35 0)    Current Meds   1  Prenatal Vitamin TABS; TAKE 1 TABLET DAILY; Therapy: (Recorded:[id number]) to Recorded    Allergies    1  No Known Drug Allergies    2  No Known Environmental Allergies   3   No Known Food Allergies    Signatures   Electronically signed by : Moody Luevano RN; Aug 11 2016  3:51PM EST                       (Author)

## 2018-01-13 NOTE — PROGRESS NOTES
AUG 11 2016         RE: Ryan Hassan                                   To: CHELY Yuen    MR#: [id number]                                    [facility]   : [address]   ENC: [id number]:MIRA Gramajo, [address] [facility]   (Exam #: [id number])                           Fax: [phone]      The LMP of this 30year old,  G2, P0-0-0-0 patient was MAY 16 2016, giving   her an RAY of 2017 and a current gestational age of 16 weeks 3 days   by dates  A sonographic examination was performed on AUG 11 2016 using   real time equipment  The ultrasound examination was performed using   abdominal technique  The patient has a BMI of 34 6  Her blood pressure   today was 119/64  Earliest ultrasound found in her record; MFM scan 16 8w3d 17 RAY   Multiple longitudinal and transverse sections revealed a kincaid   intrauterine pregnancy with the fetus in vertex presentation  The placenta   is anterior in implantation, grade 0 in appearance  Cardiac motion was observed at 152 bpm       INDICATIONS      first trimester genetic screening   prior  IUFD   obesity      Exam Types      Level I      RESULTS      Fetus # 1 of 1   Vertex presentation   Fetal growth appeared normal      MEASUREMENTS (* Included In Average GA)      CRL              6 6 cm        12 weeks 5 days*   Nuchal Trans    1 40 mm      THE AVERAGE GESTATIONAL AGE is 12 weeks 5 days +/- 7 days  UTERINE ARTERIES                                  S/D   PI    RI    NOTCH       Left Uterine Artery              4 07       Right Uterine Artery             1 63      ANATOMY COMMENTS      Anatomic detail is limited at this gestational age  The yolk sac was not   noted  The fetal cranium appeared normal in shape and the nuchal   translucency was normal in size (1 4mm)  The nasal bone appears to be   present  The intracranial anatomy was unremarkable  Evaluation of the   spine revealed no obvious evidence for a neural tube defect  Anatomy of   the fetal thorax appeared within normal limits  The cardiac rhythm was   regular  Within the abdomen, stomach & bladder were visualized and the   abdominal wall appeared intact  A three vessel cord appears to be present  Active movement of the fetal body & extremities was seen  There is no   suspicion of a subchorionic bleed  The placental cord insertion was   normal    The uterine artery Dopplers are abnormal for this gestational   age  There is no suspicion of a uterine myoma  Free fluid is not seen in   the posterior cul-de-sac  ADNEXA      The left ovary appeared normal and measured 4 9 x 3 7 x 2 6 cm with a   volume of 24 7 cc  The right ovary appeared normal and measured 2 5 x 1 3   x 1 4 cm with a volume of 2 4 cc  AMNIOTIC FLUID      Amniotic Fluid: Normal      IMPRESSION      Haile IUP   12 weeks and 5 days by this ultrasound  (RAY=2017)   Vertex presentation   Fetal growth appeared normal   Regular fetal heart rate of 152 bpm   Anterior placenta      GENERAL COMMENT      I had the pleasure of seeing Gloria Carr in the  center for an   early sequential screen  She is a 75-year-old  2 para 0010 with a   working due date of 2017  Her OB history is significant for a   fetal demise noted at 41 weeks  She noted that she had passed her diabetes   screen in her prior pregnancy  She had a slightly low MCV value of 77  She   presents today for her sequential screen  Today's ultrasound showed a viable fetus in a variable presentation  The   amniotic fluid and overall fetal growth appeared normal  The placenta is   anterior  in location and there is no evidence of a placenta previa  There   were no obvious anomalies seen in the fetus today  Down syndrome screening   was reassuring as the nuchal translucency was normal in size and the nasal   bone was present   The uterine artery Doppler flow studies were abnormal   bilaterally  Maternal ovaries are seen bilaterally and appeared normal    There were no subchorionic hematomas or uterine myomas  One issue on today's ultrasound is that the uterine artery Doppler flow   studies are abnormal  This does increase the risk of adverse pregnancy   outcomes including preeclampsia and fetal growth restriction  Low dose   aspirin has been shown in many studies to help reduce the risk of these   adverse pregnancy outcomes, however low-dose aspirin therapy must be   started prior to 16 weeks in order to help prevent adverse pregnancy   outcomes  Her recent meta-analysis showed a very favorable effect when   started less than 16 weeks on reducing the risk of preeclampsia and fetal   growth restriction  It started after 16 weeks and has not shown any   significant favorable affect on reducing the risk of adverse pregnancy   outcomes  As mentioned to her today the uterine artery Doppler flow studies are   abnormal  Otherwise the fetus showed normal anatomy and a normal nuchal   translucency of 1 4 mm in an enlarged midsagittal plane and the nasal bone   was present  The fetus appears to be growing well and there were no   obvious birth defects seen today  Note that I did recommend beginning   low-dose aspirin to help open up her uterine artery vessels  She declined   the sequential screen today  I did offer to her the quad screen as well  She will return for an early 16 week growth scan and then a 20 week level   II ultrasound  Thank you kindly for this referral Total face-to-face time   with the patient, excluding ultrasound time was 15 minutes with more than   50% of the time devoted to counseling and coordination of care  KRYSTAL Miranda M D     Maternal-Fetal Medicine   Electronically signed 08/11/16 19:32

## 2018-01-14 VITALS
HEIGHT: 62 IN | SYSTOLIC BLOOD PRESSURE: 106 MMHG | WEIGHT: 183.8 LBS | BODY MASS INDEX: 33.82 KG/M2 | DIASTOLIC BLOOD PRESSURE: 58 MMHG

## 2018-01-14 VITALS
HEART RATE: 60 BPM | SYSTOLIC BLOOD PRESSURE: 120 MMHG | DIASTOLIC BLOOD PRESSURE: 80 MMHG | HEIGHT: 62 IN | RESPIRATION RATE: 16 BRPM | BODY MASS INDEX: 31.47 KG/M2 | WEIGHT: 171 LBS

## 2018-01-14 VITALS
SYSTOLIC BLOOD PRESSURE: 116 MMHG | BODY MASS INDEX: 33.86 KG/M2 | HEART RATE: 87 BPM | WEIGHT: 184 LBS | HEIGHT: 62 IN | DIASTOLIC BLOOD PRESSURE: 68 MMHG

## 2018-01-14 VITALS
HEIGHT: 62 IN | DIASTOLIC BLOOD PRESSURE: 68 MMHG | WEIGHT: 186.6 LBS | SYSTOLIC BLOOD PRESSURE: 112 MMHG | OXYGEN SATURATION: 98 % | BODY MASS INDEX: 34.34 KG/M2 | HEART RATE: 96 BPM

## 2018-01-14 VITALS
WEIGHT: 173.06 LBS | DIASTOLIC BLOOD PRESSURE: 60 MMHG | HEART RATE: 98 BPM | BODY MASS INDEX: 31.85 KG/M2 | RESPIRATION RATE: 16 BRPM | HEIGHT: 62 IN | TEMPERATURE: 98.1 F | OXYGEN SATURATION: 98 % | SYSTOLIC BLOOD PRESSURE: 102 MMHG

## 2018-01-14 VITALS
HEIGHT: 62 IN | WEIGHT: 187.1 LBS | BODY MASS INDEX: 34.43 KG/M2 | DIASTOLIC BLOOD PRESSURE: 61 MMHG | SYSTOLIC BLOOD PRESSURE: 104 MMHG

## 2018-01-14 VITALS
WEIGHT: 184.6 LBS | DIASTOLIC BLOOD PRESSURE: 62 MMHG | SYSTOLIC BLOOD PRESSURE: 110 MMHG | BODY MASS INDEX: 33.97 KG/M2 | OXYGEN SATURATION: 97 % | HEART RATE: 86 BPM | HEIGHT: 62 IN

## 2018-01-14 VITALS
HEIGHT: 62 IN | HEART RATE: 99 BPM | WEIGHT: 184 LBS | BODY MASS INDEX: 33.86 KG/M2 | SYSTOLIC BLOOD PRESSURE: 112 MMHG | DIASTOLIC BLOOD PRESSURE: 68 MMHG

## 2018-01-14 NOTE — RESULT NOTES
Verified Results  (1) HERPES I/II ANTIBODIES, IGG [id number] 01:51PM [employer] [employer] Order Number: [id number]     Test Name Result Flag Reference   HSV I KOURTNEY IGG <0 91 index  0 00 - 0 90   Negative        <0 91                                   Equivocal 0 91 - 1 09                                   Positive        >1 09   Note: Negative indicates no antibodies detected to   HSV-1  Equivocal may suggest early infection  If   clinically appropriate, retest at later date  Positive   indicates antibodies detected to HSV-1    HSV II KOURTNEY IGG <0 91 index  0 00 - 0 90   Negative        <0 91                                   Equivocal 0 91 - 1 09                                   Positive        >1 09   Note: Negative indicates no antibodies detected to   HSV-2  Equivocal may suggest early infection  If   clinically appropriate, retest at later date  Positive   indicates antibodies detected to HSV-2      Performed at:  [facility] [address]  [id number]  : Lesa Dominguez MD, Phone:  [phone]     (1) HERPES I/II ANTIBODIES, IGM [id number] 01:51PM [employer] [employer] Order Number: [id number]     Test Name Result Flag Reference   HSVI/II COMB AB IGM <0 91 Ratio  0 00 - 0 90   Negative        <0 91                                   Equivocal 0 91 - 1 09                                   Positive        >1 09    Performed at:  [facility] [address]  [id number]  : Lesa Dominguez MD, Phone:  [phone]

## 2018-01-15 NOTE — PROGRESS NOTES
Assessment    1  Hematochezia (578 1) (K92 1)    Discussion/Summary    Pt is a 31 yo F  1  Hematochezia - Pt appears clinically stable today without any further sx  Request records from [facility]  She was educated on the different etiologies of her sx  May likely have been sec to constipation and radical change in her diet from liquid diet to abrupt change to solid foods  Pt hesitant today to have rectal exam  She was advised on the importance of increasing her fiber intake  Consider starting Bowel regiment if she is not having reg BM  Pt will be f/u with Dr Jorge Win tomorrow  Chief Complaint  PT here for a f/u visit to her cholelithiasis  states that it is about the same and she is having issues with constipation  states she also has an appt    tomorrow with the gallbladder doctor  History of Present Illness    Kellee Hutchins presents with complaints of gradual onset of intermittent episodes of mild bright red blood per rectum, described as blood filling the toilet  Episodes started 2 days ago  Pt states that she started a liquid fast for the past 4 weeks and tried a "salt water flush" and noticed BRBPR 2 dys ago  She did present to [address] [place] [address] ED and had an xray which she states that did show constipation  Pt was given a supository which has not helped initially  she has since been on a solid diet  She has not noticed the bleeding since this  Associated symptoms include vomiting, but no abdominal distention, no nausea, no fever, no chills, no palpitations and no dizziness  Review of Systems    Constitutional: no fever, not feeling poorly, no chills and not feeling tired  Eyes: no eyesight problems and no purulent discharge from the eyes  ENT: no sore throat and no nasal discharge  Cardiovascular: no chest pain and no palpitations  Respiratory: no cough and no shortness of breath during exertion  Gastrointestinal: nausea, vomiting and bloody stools  Genitourinary: no dysuria and no incontinence  Musculoskeletal: no arthralgias and no myalgias  Integumentary: no rashes and no skin lesions  Neurological: no headache, no numbness and no dizziness  Psychiatric: no anxiety and no depression  Endocrine: no muscle weakness and no hot flashes  Active Problems    1  Cholelithiasis (574 20) (K80 20)    Past Medical History    The active problems and past medical history were reviewed and updated today  Surgical History    The surgical history was reviewed and updated today  Family History    1  Family history of malignant neoplasm of breast (V16 3) (Z80 3)    2  Family history of malignant neoplasm of breast (V16 3) (Z80 3)    The family history was reviewed and updated today  Social History    · Never a smoker   · Rarely consumes alcohol (V49 89) (Z78 9)  The social history was reviewed and updated today  The social history was reviewed and is unchanged  Current Meds   1  No Reported Medications Recorded    The medication list was reviewed and updated today  Allergies    1  No Known Drug Allergies    Vitals  Vital Signs [Data Includes: Current Encounter]    Recorded: 62GLL4783 03:01PM   Temperature 96 9 F, Tympanic   Heart Rate 76   Respiration 15   Systolic 469   Diastolic 70   Height 5 ft 3 in   Weight 173 lb 8 0 oz   BMI Calculated 30 73   BSA Calculated 1 82   O2 Saturation 99     Physical Exam    Pulmonary   Respiratory effort: No increased work of breathing or signs of respiratory distress  Auscultation of lungs: Clear to auscultation  Cardiovascular   Auscultation of heart: Normal rate and rhythm, normal S1 and S2, without murmurs  Examination of extremities for edema and/or varicosities: Normal     Abdomen   Abdomen: Non-tender, no masses  The abdomen was flat  Bowel sounds were normal  The abdomen was soft and nontender  The abdomen was not firm and not rigid  No rebound tenderness  No guarding          Future Appointments    Date/Time Provider Specialty Site   01/29/2016 08:30 AM Konstantin Reynaga MD General Surgery [facility]   83/46/5931 31:96 AM Marisol Dotson DO Obstetrics/Gynecology [address] [facility]     Signatures   Electronically signed by :  Roman Cano DO; Jan 28 2016  4:01PM EST                       (Author)

## 2018-01-15 NOTE — PROGRESS NOTES
2017         RE: Gina Carolyn                                   To: CHELY Faustin    MR#: [id number]                                    [facility]   : [address]   ENC: [id number]                             [facility] [place], [facility]   (Exam #: [id number])                           Fax: [phone]      The LMP of this 27year old,  G2, P0-0-0-0 patient was MAY 16 2016, giving   her an RAY of 2017 and a current gestational age of 27 weeks 1 day   by dates  A sonographic examination was performed on 2017 using   real time equipment  The ultrasound examination was performed using   abdominal technique  The patient has a BMI of 34 4  Her blood pressure   today was 102/56  Earliest ultrasound found in her record; MFM scan 16 8w3d 17 RAY      Cardiac motion was observed at 141 bpm       INDICATIONS      diabetes, gestational   previous stillbirth      Exam Types      Amniotic Fluid Index      RESULTS      Fetus # 1 of 1   Vertex presentation   Placenta Location = Anterior   No placenta previa   Placenta Grade = II      The NST was reactive with no decelerations  AMNIOTIC FLUID      Q1: 3 0      Q2: 5 5      Q3:          Q4: 3 6   GARETH Total = 12 1 cm   Amniotic Fluid: Normal      IMPRESSION      Haile IUP   Vertex presentation   Regular fetal heart rate of 141 bpm   Anterior placenta   No placenta previa      GENERAL COMMENT      The patient presented today for antepartum fetal surveillance secondary to   gestational diabetes and history of stillbirth  She had a modified   biophysical profile, which includes an NST and evaluation of the amniotic   fluid  The NST was reactive and reassuring  The amniotic fluid index was   12 1cm, which is normal for gestational age  Recommend continued twice weekly fetal testing secondary to gestational   diabetes and history of stillbirth        Thank very much for allowing us to participate in the care of your   patient  Should you have any questions, please do not hesitate to contact   our office  [place] KRYSTAL Garrido M D  Electronically signed 01/18/17 09:12           Electronically signed by:Naomy MO    Jan 19 2017  3:06PM EST Review

## 2018-01-15 NOTE — PROGRESS NOTES
DEC 30 2016         RE: Mariaelena Cade                                   To: Adebayo LealCHELY    MR#: [id number]                                    [facility]   : [address] [place] [address]   ENC: [id number]                             [facility] [place], [address] [facility]   (Exam #: [id number])                           Fax: [phone]      The LMP of this 27year old,  G2, P0-0-0-0 patient was MAY 16 2016, giving   her an RAY of 2017 and a current gestational age of 26 weeks 4 days   by dates  A sonographic examination was performed on DEC 30 2016 using   real time equipment  The ultrasound examination was performed using   abdominal technique  The patient has a BMI of 34 7  Her blood pressure   today was 109/61  Earliest ultrasound found in her record; MFM scan 16 8w3d 17 RAY         Laura Martinez has no complaints today  She reports regular fetal movement and   denies problems related to vaginal bleeding,  labor, or   hypertension  She is currently treated with 26 units of Levemir at night  She continues to take 81 mg of aspirin a day        Cardiac motion was observed at 142 bpm       INDICATIONS      diabetes, gestational   previous stillbirth      Exam Types      Level I      RESULTS      Fetus # 1 of 1   Vertex presentation   Fetal growth appeared normal   Placenta Location = Anterior   No placenta previa   Placenta Grade = II      MEASUREMENTS (* Included In Average GA)      AC              27 6 cm        31 weeks 5 days* (32%)   BPD              7 7 cm        31 weeks 0 days* (12%)   HC              29 0 cm        31 weeks 4 days* (16%)   Femur            6 0 cm        31 weeks 2 days* (29%)      Humerus          5 6 cm        32 weeks 2 days  (51%)      Cerebellum       4 0 cm        33 weeks 4 days      HC/AC           1 05   FL/AC           0 22   FL/BPD          0 78   EFW (Ac/Fl/Hc)  1804 grams - 3 lbs 15 oz                 (30%) THE AVERAGE GESTATIONAL AGE is 31 weeks 3 days +/- 18 days  AMNIOTIC FLUID      Q1: 3 4      Q2: 5 1      Q3: 2 6      Q4: 4 3   GARETH Total = 15 5 cm   Amniotic Fluid: Normal      ANATOMY DETAILS      Visualized Appearing Sonographically Normal:   HEAD: (Calvarium, BPD Level, Lateral Ventricles, Cerebellum);    TH  CAV :   (Diaphragm); HEART: (Metanautix);    STOMACH, RIGHT KIDNEY, LEFT   KIDNEY, BLADDER, PLACENTA      Not Visualized:   HEART: (Proximal Left Outflow, Proximal Right Outflow)      IMPRESSION      Haile IUP   31 weeks and 3 days by this ultrasound  (RAY=FEB 28 2017)   Vertex presentation   Fetal growth appeared normal   Regular fetal heart rate of 142 bpm   Anterior placenta   No placenta previa      GENERAL COMMENT      No fetal structural abnormality is identified on the Level I survey today  Fetal interval growth and amniotic fluid volume are normal       Today's ultrasound findings and suggested follow-up were discussed in   detail with Jd  Daily third trimester fetal kick counting was   discussed at the visit today  Twice per week non stress testing and weekly   amniotic fluid volume assessments is recommended for third trimester fetal   surveillance  Repeat fetal growth evaluation will be performed in 4 weeks  Continuation of daily low dose aspirin therapy is recommended through 34   weeks gestation  Delivery is recommended at 39 weeks gestation  Mauricio Lopez   should continue to maintain contact with the Diabetes and Pregnancy   program in the [address] [place] [address] at least once a week for review of her   blood glucose values and adjustment of insulin doses  The face to face time, in addition to time spent discussing ultrasound   results, was approximately 10 minutes, greater than 50% of which was spent   during counseling and coordination of care  KRYSTAL Bustillos M D     Maternal-Fetal Medicine   Electronically signed 12/30/16 17:52 Electronically signed by:Meli Sultana DO  Rell  3 4498 04:45SE EST

## 2018-01-16 NOTE — PROGRESS NOTES
2017         RE: Margarito Look                                   To: Nerissa JacobsCHELY    MR#: [id number]                                    [facility]   : [address]   ENC: [id number]:[place], 100 [facility]   (Exam #: [id number])                          Fax: [phone]      The LMP of this 27year old,  G2, P0-0-0-0 patient was MAY 16 2016, giving   her an RAY of 2017 and a current gestational age of 37 weeks 1 day   by dates  A sonographic examination was performed on 2017 using real   time equipment  The ultrasound examination was performed using abdominal   technique  The patient has a BMI of 33 5  Her blood pressure today was   104/59  Earliest ultrasound found in her record; MFM scan 16 8w3d 17 RAY      Problem list:   1  History of a 40 week five-day stillbirth that had a shoulder dystocia   at delivery  Baby weighed 4315 g per the delivery note   2  GDM  - on Lantus         Cardiac motion was observed at 144 bpm       INDICATIONS      diabetes, gestational   previous stillbirth      Exam Types      Amniotic Fluid Index      RESULTS      Fetus # 1 of 1   Vertex presentation   Placenta Location = Anterior   No placenta previa   Placenta Grade = II      The NST was reactive with no decelerations  AMNIOTIC FLUID      Q1: 4 1      Q2: 3 6      Q3: 3 4      Q4: 3 5   GARETH Total = 14 6 cm   Amniotic Fluid: Normal      IMPRESSION      Haile IUP   Vertex presentation   Regular fetal heart rate of 144 bpm   Anterior placenta   No placenta previa      GENERAL COMMENT      Her follow up care should include twice weekly NST's and a once weekly   amniotic fluid assessment, along with her daily kick counts  KRYSTAL Pierce M D     Maternal-Fetal Medicine   Electronically signed 17 09:51           Electronically signed by:Ana Mandy Bumpers M D    Feb 7 2017 11:59AM EST Acknowledgement

## 2018-01-16 NOTE — RESULT NOTES
Verified Results  (1) THIN PREP PAP WITH IMAGING [id number] 72:91AY Den Diss     Test Name Result Flag Reference   LAB AP CASE REPORT (Report)     Gynecologic Cytology Report            Case: [id number]                  Authorizing Provider: Stiven Burnham DO     Collected:      02/19/2016 1043        First Screen:     Jaclynne Gunnar, CT    Received:      02/22/2016 1043        Specimen:  LIQUID-BASED PAP, SCREENING, Cervix   LAB AP GYN PRIMARY INTERPRETATION      Negative for intraepithelial lesion or malignancy   LAB AP GYN INTERPRETATION      Fungal organisms morphologically consistent with Candida spp   LAB AP GYN SPECIMEN ADEQUACY      Satisfactory for evaluation  Endocervical/transformation zone component present  LAB AP GYN ADDITIONAL INFORMATION (Report)     [employer]'s FDA approved ,  and ThinPrep Imaging System are   utilized with strict adherence to the 's instruction manual to   prepare gynecologic and non-gynecologic cytology specimens for the   production of ThinPrep slides as well as for gynecologic ThinPrep imaging  These processes have been validated by our laboratory and/or by the     The Pap test is not a diagnostic procedure and should not be used as the   sole means to detect cervical cancer  It is only a screening procedure to   aid in the detection of cervical cancer and its precursors  Both   false-negative and false-positive results have been experienced  Your   patient's test result should be interpreted in this context together with   the history and clinical findings     LAB AP LMP 1/2016 1/2016

## 2018-01-17 NOTE — PROGRESS NOTES
2017         RE: Yassine Khanna                                   To: CHELY Stahl    MR#: [id number]                                    [facility]   : [address]   ENC: [id number]                             [place], 100 [facility]   (Exam #: [id number])                           Fax: [phone]      The LMP of this 27year old,  G2, P0-0-0-0 patient was MAY 16 2016, giving   her an RAY of 2017 and a current gestational age of 29 weeks 0 days   by dates  A sonographic examination was performed on 2017 using real   time equipment  The ultrasound examination was performed using abdominal   technique  The patient has a BMI of 34 2  Her blood pressure today was   104/61  Earliest ultrasound found in her record; MFM scan 16 8w3d 17 RAY      Cardiac motion was observed at 157 bpm       INDICATIONS      diabetes, gestational   previous stillbirth      Exam Types      Amniotic Fluid Index   NST      RESULTS      Fetus # 1 of 1   Vertex presentation   Placenta Location = Anterior   Placenta Grade = I      The NST was reactive with no decelerations  AMNIOTIC FLUID      Q1: 2 4      Q2: 4 0      Q3: 2 4      Q4: 2 7   GARETH Total = 11 4 cm   Amniotic Fluid: Normal      BIOPHYSICAL PROFILE      The Biophysical Profile score was 10/10  Breathin  Movement: 2  Tone: 2  AFV: 2  NST: 2   The NST was reactive with no decelerations  IMPRESSION      Haile IUP   Vertex presentation   Regular fetal heart rate of 157 bpm   Anterior placenta      RECOMMENDATION      GARETH: 1 Week   NST: 2X per week      KRYSTAL Eric S , BRANDY Lozano     Maternal-Fetal Medicine   Electronically signed 17 14:53

## 2018-01-18 NOTE — MISCELLANEOUS
Message  Mailed colono letter to patients home address  Tasked PCPs office  (Patient is not due to colono until she reaches age 46 )      Active Problems    1  Change in bowel habits (787 99) (R19 4)   2  Constipation (564 00) (K59 00)   3  Hematochezia (578 1) (K92 1)   4  Procreative management counseling (V26 49) (Z31 69)   5  Well female exam with routine gynecological exam (V72 31) (Z01 419)    Current Meds   1  No Reported Medications  Requested for: 65MAI9163 Recorded    Allergies    1  No Known Drug Allergies    Plan  Change in bowel habits    · COLONOSCOPY ; every 1 month;  Last 91MBV0948; Permanently Deferred;  Status:Permanent Deferral    Signatures   Electronically signed by : Marcelle Mathias, ; Apr 4 2016  9:20AM EST                       (Author)

## 2018-01-18 NOTE — MISCELLANEOUS
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)   4  High risk pregnancy, antepartum (V23 9) (O09 90)   5  History of shoulder dystocia in prior pregnancy (V13 29) (Z87 59)   6  Prior pregnancy with fetal demise and current pregnancy, unspecified trimester (V23 5)   (O09 299)   7  Screen for STD (sexually transmitted disease) (V74 5) (Z11 3)    Current Meds   1  Aspirin EC 81 MG Oral Tablet Delayed Release; Therapy: 72Cif6274 to Recorded   2  PreNatal  MG Oral Capsule; TAKE 1 CAPSULE DAILY; Therapy: 01Jbu9826 to (Jenna Montalvo)  Requested for: 84Pyo1408; Last   Rx:05Dpy3009 Ordered   3  Prenatal Vitamin TABS; TAKE 1 TABLET DAILY; Therapy: (Recorded:33Zii3571) to Recorded    Allergies    1  No Known Drug Allergies    2  No Known Environmental Allergies   3   No Known Food Allergies    Signatures   Electronically signed by : Chino Desai RN; Oct 17 2016  9:58AM EST                       (Author)

## 2018-01-18 NOTE — PROGRESS NOTES
SEP 7 2016         RE: Vale Rich                                   To: CHELY Cornejo    MR#: [id number]                                    [facility]   : Maulik Montez   ENC: [id number]                             [facility] [place], 100 [facility]   (Exam #: [id number])                           Fax: [phone]      The LMP of this 30year old,  G2, P0-0-0-0 patient was MAY 16 2016, giving   her an RAY of 2017 and a current gestational age of 12 weeks 2 days   by dates  A sonographic examination was performed on SEP 7 2016 using real   time equipment  The ultrasound examination was performed using abdominal   technique  The patient has a BMI of 34 7  Her blood pressure today was   109/69  Earliest ultrasound found in her record; MFM scan 16 8w3d 17 RAY      Problem list:   1  Fetal demise at 40w5d  A small placenta <3% was found with a marginal   PCI  Minimal evidence for infarctions noted on < 5% of the placenta  No   karyotype was completed  Lupus anticoagulant was indeterminant  She is on   baby aspirin  No evidence for diabetes  She declined genetic screening   early in the pregnancy  EFW by US prior to delivery showed a 3700gm fetus   with ascites and pleural effusions and anhydramnios  Fetal weight 3601 gm   at the time of the autopsy and 4315 gm at delivery  Maybe the discrepancy   was due to the ascites and pleural effusions noted on the US and which was   removed during the autopsy  2  Shoulder dystocia noted at delivery requiring the [facility]  3  She declined genetic screening in this pregnancy        Cardiac motion was observed at 147 bpm       INDICATIONS      prior  IUFD   obesity   early fetal anatomy and growth      Exam Types      Level I      RESULTS      Fetus # 1 of 1   Variable presentation   Fetal growth appeared normal   Placenta Location = Anterior   No placenta previa   Placenta Grade = I MEASUREMENTS (* Included In Average GA)      AC              10 7 cm        16 weeks 1 day * (56%)   BPD              3 5 cm        16 weeks 6 days*   HC              12 8 cm        16 weeks 3 days*   Femur            2 2 cm        16 weeks 5 days*      Cerebellum       1 6 cm        16 weeks 5 days      HC/AC           1 20   FL/AC           0 21   FL/BPD          0 64   EFW (Ac/Fl/Hc)   160 grams - 0 lbs 6 oz      THE AVERAGE GESTATIONAL AGE is 16 weeks 4 days +/- 7 days  AMNIOTIC FLUID         Largest Vertical Pocket = 4 6 cm   Amniotic Fluid: Normal      ANATOMY COMMENTS      Anatomic detail is limited at this early gestational age  The fetal   cranium appeared normal in shape and the nuchal fold was normal in   appearance  The fetal face appears normal  The nasal bone appears to be   present  The intracranial anatomy was unremarkable  Limited evaluation   of the spine revealed no obvious evidence of an open neural tube defect  Anatomy of the fetal thorax appeared within normal limits  The cardiac   rhythm was regular  A four chamber heart and both outflow tracts are seen   with the help of color doppler and the cardiac axis appears normal  Within   the abdomen, the kidneys, diaphragm, stomach & bladder were visualized and   the abdominal wall appeared intact  A three vessel cord appears to be   present  Active movement of the fetal body & 4 extremities was seen  The   genitalia appear normal   There is no suspicion of a subchorionic   hematoma  The placental cord insertion appears normal  The culdesac was   reviewed and no free fluid was appreciated  The cervix was evaluated   transabdominally and appears normal measuring 35 mm  There is no evidence   of spontaneous funneling        IMPRESSION      Haile IUP   16 weeks and 4 days by this ultrasound  (RAY=FEB 18 2017)   Variable presentation   Fetal growth appeared normal   Regular fetal heart rate of 147 bpm   Anterior placenta   No placenta previa      GENERAL COMMENT      The patient was informed of the findings and counseled about the   limitations of the exam in detecting all forms of fetal congenital   abnormalities  She denies any vaginal bleeding or uterine cramping/contractions  Exam shows she is comfortable and her abdomen is non tender  Follow up recommended:   1  I encouraged her to complete her early diabetes screening due to her   history of a prior stillbirth and her 9lb baby at birth with a shoulder   dystocia  2  She is set up for an anatomy scan at 20 weeks  3  Recommend a growth scan at 28 and 34 weeks  4  Recommend starting twice weekly nonstress tests and weekly fluid scans   from 32 weeks on    5  Can offer delivery any time after 39 weeks  6  Recommend a repeat lupus anticoagulant level after she is no longer on   baby aspirin  9  We discussed the risks of shoulder dystocia in future pregnancies since   it occurred in her prior pregnancy  Of interest the baby weighed over 9lbs   at birth but not at the time of the autopsy  A 9 lb baby would not go   along with a small placenta  The baby per parents was weighed bundled in   towels and ascites and pleural effusions were seen on US prior to   delivery  Maybe the towels and the excess fluid was enough to make the   baby seem larger on the scale than what was seen at autopsy  KRYSTAL Siddiqui M D  Maternal-Fetal Medicine   Electronically signed 09/07/16 17:33           Electronically signed Ashwini MO    Sep  9 2016  1:09PM EST Acknowledgement

## 2018-03-07 NOTE — PROGRESS NOTES
[facility] Education 2nd Trimester:   Second Trimester Education provided: benefits of breastfeeding, importance of exclusive breastfeeding, early initiation of breastfeeding and exclusive breastfeeding for the first 6 months  Prenatal education provided by: Aryan Cedillo Date: 11/4/16        Signatures   Electronically signed by : BRANDY Marques ; Nov 4 2016 10:58AM EST                       (Author)

## 2018-04-06 ENCOUNTER — INITIAL PRENATAL (OUTPATIENT)
Dept: OBGYN CLINIC | Facility: CLINIC | Age: 28
End: 2018-04-06

## 2018-04-06 VITALS
SYSTOLIC BLOOD PRESSURE: 100 MMHG | HEART RATE: 92 BPM | HEIGHT: 62 IN | BODY MASS INDEX: 30.4 KG/M2 | DIASTOLIC BLOOD PRESSURE: 60 MMHG | RESPIRATION RATE: 16 BRPM | WEIGHT: 165.2 LBS

## 2018-04-06 DIAGNOSIS — Z34.81 ENCOUNTER FOR SUPERVISION OF OTHER NORMAL PREGNANCY IN FIRST TRIMESTER: Primary | ICD-10-CM

## 2018-04-06 DIAGNOSIS — Z3A.01 LESS THAN 8 WEEKS GESTATION OF PREGNANCY: ICD-10-CM

## 2018-04-06 DIAGNOSIS — Z86.32 HISTORY OF GESTATIONAL DIABETES MELLITUS (GDM) IN PRIOR PREGNANCY, CURRENTLY PREGNANT IN FIRST TRIMESTER: ICD-10-CM

## 2018-04-06 DIAGNOSIS — O09.291 HISTORY OF GESTATIONAL DIABETES MELLITUS (GDM) IN PRIOR PREGNANCY, CURRENTLY PREGNANT IN FIRST TRIMESTER: ICD-10-CM

## 2018-04-06 PROBLEM — Z87.59 HISTORY OF IUFD: Status: ACTIVE | Noted: 2018-04-06

## 2018-04-06 PROBLEM — Z3A.38 38 WEEKS GESTATION OF PREGNANCY: Status: RESOLVED | Noted: 2017-02-11 | Resolved: 2018-04-06

## 2018-04-06 PROCEDURE — OBC: Performed by: OBSTETRICS & GYNECOLOGY

## 2018-04-06 NOTE — PROGRESS NOTES
OB intake complete  Prenatal labs ordered including - prenatal panel, varicella, 1 hour glucola, and ultrasound  S/S of pregnancy - fatigue and breast tenderness  Genetic screening reviewed -- unsure  PHQ 9 screening results - 0  FOB involved and supportive

## 2018-04-09 ENCOUNTER — TELEPHONE (OUTPATIENT)
Dept: OBGYN CLINIC | Facility: CLINIC | Age: 28
End: 2018-04-09

## 2018-04-09 NOTE — TELEPHONE ENCOUNTER
7w4d OB states yesterday around noon had waves of cramping that has been so bad they brought her to her knees  Denies any vaginal bleeding  Cramping is a wave all around her stomach  Has tried to take tylenol, but vomited right after  Has tried warm bath helped some but pain was still there  Will try tylenol again with food  Patient advised to increase fluid intake and rest  Pt states she has ultrasound scheduled for Thursday at 10:30 at [facility]  Routing to provider for advise

## 2018-04-09 NOTE — TELEPHONE ENCOUNTER
Advised patient to try taking tums as well, could be reflux  Will follow up with her ultrasound once it is read  Cramping is lower all over abdomen not necessarily stomach  States she did recently add chicken back into diet  Patient not sure if intestinal discomfort  Patient will try tums for now and call if symptoms worsen  Routing to provider to update

## 2018-04-12 ENCOUNTER — HOSPITAL ENCOUNTER (OUTPATIENT)
Dept: RADIOLOGY | Age: 28
Discharge: HOME | End: 2018-04-12
Payer: COMMERCIAL

## 2018-04-12 DIAGNOSIS — Z3A.01 LESS THAN 8 WEEKS GESTATION OF PREGNANCY: ICD-10-CM

## 2018-04-12 DIAGNOSIS — Z34.81 ENCOUNTER FOR SUPERVISION OF OTHER NORMAL PREGNANCY IN FIRST TRIMESTER: ICD-10-CM

## 2018-04-12 PROCEDURE — 76801 OB US < 14 WKS SINGLE FETUS: CPT

## 2018-04-24 ENCOUNTER — TELEPHONE (OUTPATIENT)
Dept: OBGYN CLINIC | Facility: CLINIC | Age: 28
End: 2018-04-24

## 2018-05-11 ENCOUNTER — ROUTINE PRENATAL (OUTPATIENT)
Dept: OBGYN CLINIC | Facility: CLINIC | Age: 28
End: 2018-05-11

## 2018-05-11 VITALS — DIASTOLIC BLOOD PRESSURE: 60 MMHG | SYSTOLIC BLOOD PRESSURE: 102 MMHG

## 2018-05-11 DIAGNOSIS — Z12.4 ENCOUNTER FOR SCREENING FOR MALIGNANT NEOPLASM OF CERVIX: ICD-10-CM

## 2018-05-11 DIAGNOSIS — Z34.81 ENCOUNTER FOR SUPERVISION OF OTHER NORMAL PREGNANCY IN FIRST TRIMESTER: Primary | ICD-10-CM

## 2018-05-11 DIAGNOSIS — Z11.3 SCREENING FOR STDS (SEXUALLY TRANSMITTED DISEASES): ICD-10-CM

## 2018-05-11 PROCEDURE — PNV: Performed by: OBSTETRICS & GYNECOLOGY

## 2018-05-11 PROCEDURE — G0124 SCREEN C/V THIN LAYER BY MD: HCPCS | Performed by: PATHOLOGY

## 2018-05-11 PROCEDURE — 87624 HPV HI-RISK TYP POOLED RSLT: CPT | Performed by: OBSTETRICS & GYNECOLOGY

## 2018-05-11 PROCEDURE — G0145 SCR C/V CYTO,THINLAYER,RESCR: HCPCS | Performed by: PATHOLOGY

## 2018-05-11 PROCEDURE — 87591 N.GONORRHOEAE DNA AMP PROB: CPT | Performed by: OBSTETRICS & GYNECOLOGY

## 2018-05-11 PROCEDURE — 87491 CHLMYD TRACH DNA AMP PROBE: CPT | Performed by: OBSTETRICS & GYNECOLOGY

## 2018-05-11 NOTE — PROGRESS NOTES
29-year-old  001 at 12 1 wga  Overall she has been feeling well  Has not yet had her labs done  Pap and GC Chlamydia were collected  Reviewed patient's history    A1 GDM and last pregnancy will do an early Glucola  Has a history of a term demise    Referral placed for   f/u 4wks

## 2018-05-16 LAB
CHLAMYDIA DNA CVX QL NAA+PROBE: NORMAL
N GONORRHOEA DNA GENITAL QL NAA+PROBE: NORMAL

## 2018-05-23 ENCOUNTER — TELEPHONE (OUTPATIENT)
Dept: OBGYN CLINIC | Facility: CLINIC | Age: 28
End: 2018-05-23

## 2018-05-23 DIAGNOSIS — Z3A.16 16 WEEKS GESTATION OF PREGNANCY: Primary | ICD-10-CM

## 2018-05-23 DIAGNOSIS — Z3A.13 PREGNANCY WITH 13 COMPLETED WEEKS GESTATION: ICD-10-CM

## 2018-05-23 LAB
HPV RRNA GENITAL QL NAA+PROBE: NORMAL
LAB AP GYN PRIMARY INTERPRETATION: NORMAL
Lab: NORMAL
PATH INTERP SPEC-IMP: NORMAL

## 2018-05-23 NOTE — TELEPHONE ENCOUNTER
Patient calling because she wants to know what MFM referral is for  She is 13w6d too late for sequential screening  She does not want to do Quad Screening  States she had an ultrasound at 16wks with her last pregnancy and found out the sex of the baby at that time  States provider told her she would have same care as last pregnancy  Questioning if they will do ultrasound again at 16wks  Advised ultrasound is typically done at [facility] and this will determine the sex  Routing to provider for further advise

## 2018-05-24 NOTE — TELEPHONE ENCOUNTER
She can    Her pregnancy has no 100% reason to start, but it is not going to cause harm and may be helpful

## 2018-05-24 NOTE — TELEPHONE ENCOUNTER
She was supposed to go for a NT screen so she would set up for all her US's and potentially have NIPT  She can call to see if they will do a early second trimester anatomy

## 2018-05-24 NOTE — PROGRESS NOTES
Please call   Pap was ASCUS, but HPV neg - this is considered a normal pap and will repeat in 3 years - pregnancy related changes  GC/Chlamydia screen was neg

## 2018-05-24 NOTE — TELEPHONE ENCOUNTER
Spoke with patient states she will call MFM to schedule early second trimester anatomy  Feng Comfort whether she should start Aspirin 81mg daily  Routing to provider for advise

## 2018-06-06 ENCOUNTER — TELEPHONE (OUTPATIENT)
Dept: OBGYN CLINIC | Facility: CLINIC | Age: 28
End: 2018-06-06

## 2018-06-06 NOTE — TELEPHONE ENCOUNTER
Patient calling states she has a question about the blood sugar test     Left message on voicemail for patient to call the office

## 2018-06-06 NOTE — TELEPHONE ENCOUNTER
Patient states she is going for 1 hour glucose test she was advised not to eat a large amount of carbohydrates  Verbalized understanding

## 2018-06-08 ENCOUNTER — ULTRASOUND (OUTPATIENT)
Dept: PERINATAL CARE | Facility: MEDICAL CENTER | Age: 28
End: 2018-06-08
Payer: COMMERCIAL

## 2018-06-08 VITALS
BODY MASS INDEX: 31.28 KG/M2 | HEIGHT: 62 IN | SYSTOLIC BLOOD PRESSURE: 94 MMHG | DIASTOLIC BLOOD PRESSURE: 62 MMHG | WEIGHT: 170 LBS

## 2018-06-08 DIAGNOSIS — Z86.32 H/O GESTATIONAL DIABETES IN PRIOR PREGNANCY, CURRENTLY PREGNANT, SECOND TRIMESTER: ICD-10-CM

## 2018-06-08 DIAGNOSIS — Z3A.13 PREGNANCY WITH 13 COMPLETED WEEKS GESTATION: ICD-10-CM

## 2018-06-08 DIAGNOSIS — O99.212 OBESITY DURING PREGNANCY IN SECOND TRIMESTER: ICD-10-CM

## 2018-06-08 DIAGNOSIS — O09.292 HISTORY OF STILLBIRTH IN CURRENTLY PREGNANT PATIENT, SECOND TRIMESTER: ICD-10-CM

## 2018-06-08 DIAGNOSIS — Z3A.16 16 WEEKS GESTATION OF PREGNANCY: Primary | ICD-10-CM

## 2018-06-08 DIAGNOSIS — O09.292 H/O GESTATIONAL DIABETES IN PRIOR PREGNANCY, CURRENTLY PREGNANT, SECOND TRIMESTER: ICD-10-CM

## 2018-06-08 PROCEDURE — 99212 OFFICE O/P EST SF 10 MIN: CPT | Performed by: OBSTETRICS & GYNECOLOGY

## 2018-06-08 PROCEDURE — 76805 OB US >/= 14 WKS SNGL FETUS: CPT | Performed by: OBSTETRICS & GYNECOLOGY

## 2018-06-08 NOTE — PROGRESS NOTES
Please refer to the [facility] ultrasound report in Ob Procedures for additional information regarding the visit to the [facility]  today

## 2018-06-08 NOTE — LETTER
Alem 10, 2018     8 [facility] MD [address]  [address]  [facility] 105    Patient: Earnestine Macias   YOB: 1990   Date of Visit: 6/8/2018       Dear Dr Pa Valentino: Thank you for referring Earnestine Macias to me for evaluation  Below are my notes for this consultation  If you have questions, please do not hesitate to call me  I look forward to following your patient along with you  Sincerely,        Aidan Perez MD        CC: No Recipients  Aidan Perez MD  6/8/2018 11:51 AM  Sign at close encounter  Please refer to the [facility] ultrasound report in Ob Procedures for additional information regarding the visit to the [facility]  today

## 2018-06-15 ENCOUNTER — TRANSCRIBE ORDERS (OUTPATIENT)
Dept: LAB | Facility: CLINIC | Age: 28
End: 2018-06-15

## 2018-06-15 ENCOUNTER — APPOINTMENT (OUTPATIENT)
Dept: LAB | Facility: CLINIC | Age: 28
End: 2018-06-15
Payer: COMMERCIAL

## 2018-06-15 ENCOUNTER — TELEPHONE (OUTPATIENT)
Dept: OBGYN CLINIC | Facility: CLINIC | Age: 28
End: 2018-06-15

## 2018-06-15 DIAGNOSIS — Z86.32 HISTORY OF GESTATIONAL DIABETES: ICD-10-CM

## 2018-06-15 DIAGNOSIS — Z34.82 ENCOUNTER FOR SUPERVISION OF OTHER NORMAL PREGNANCY IN SECOND TRIMESTER: Primary | ICD-10-CM

## 2018-06-15 DIAGNOSIS — Z34.82 ENCOUNTER FOR SUPERVISION OF OTHER NORMAL PREGNANCY IN SECOND TRIMESTER: ICD-10-CM

## 2018-06-15 LAB
ABO GROUP BLD: NORMAL
BASOPHILS # BLD AUTO: 0.01 THOUSANDS/ΜL (ref 0–0.1)
BASOPHILS NFR BLD AUTO: 0 % (ref 0–1)
BILIRUB UR QL STRIP: NEGATIVE
BLD GP AB SCN SERPL QL: NEGATIVE
CLARITY UR: CLEAR
COLOR UR: NORMAL
EOSINOPHIL # BLD AUTO: 0.08 THOUSAND/ΜL (ref 0–0.61)
EOSINOPHIL NFR BLD AUTO: 1 % (ref 0–6)
ERYTHROCYTE [DISTWIDTH] IN BLOOD BY AUTOMATED COUNT: 14.9 % (ref 11.6–15.1)
GLUCOSE 1H P 50 G GLC PO SERPL-MCNC: 101 MG/DL
GLUCOSE UR STRIP-MCNC: NEGATIVE MG/DL
HCT VFR BLD AUTO: 37.6 % (ref 34.8–46.1)
HGB BLD-MCNC: 12.1 G/DL (ref 11.5–15.4)
HGB UR QL STRIP.AUTO: NEGATIVE
KETONES UR STRIP-MCNC: NEGATIVE MG/DL
LEUKOCYTE ESTERASE UR QL STRIP: NEGATIVE
LYMPHOCYTES # BLD AUTO: 1.93 THOUSANDS/ΜL (ref 0.6–4.47)
LYMPHOCYTES NFR BLD AUTO: 23 % (ref 14–44)
MCH RBC QN AUTO: 25.2 PG (ref 26.8–34.3)
MCHC RBC AUTO-ENTMCNC: 32.2 G/DL (ref 31.4–37.4)
MCV RBC AUTO: 78 FL (ref 82–98)
MONOCYTES # BLD AUTO: 0.46 THOUSAND/ΜL (ref 0.17–1.22)
MONOCYTES NFR BLD AUTO: 5 % (ref 4–12)
NEUTROPHILS # BLD AUTO: 6.05 THOUSANDS/ΜL (ref 1.85–7.62)
NEUTS SEG NFR BLD AUTO: 71 % (ref 43–75)
NITRITE UR QL STRIP: NEGATIVE
PH UR STRIP.AUTO: 5.5 [PH] (ref 4.5–8)
PLATELET # BLD AUTO: 266 THOUSANDS/UL (ref 149–390)
PMV BLD AUTO: 11.1 FL (ref 8.9–12.7)
PROT UR STRIP-MCNC: NEGATIVE MG/DL
RBC # BLD AUTO: 4.8 MILLION/UL (ref 3.81–5.12)
RH BLD: POSITIVE
RPR SER QL: NORMAL
RUBV IGG SERPL IA-ACNC: 7.6 IU/ML
SP GR UR STRIP.AUTO: <=1.005 (ref 1–1.03)
SPECIMEN EXPIRATION DATE: NORMAL
UROBILINOGEN UR QL STRIP.AUTO: 0.2 E.U./DL
WBC # BLD AUTO: 8.53 THOUSAND/UL (ref 4.31–10.16)

## 2018-06-15 PROCEDURE — 81003 URINALYSIS AUTO W/O SCOPE: CPT

## 2018-06-15 PROCEDURE — 82950 GLUCOSE TEST: CPT

## 2018-06-15 PROCEDURE — 87086 URINE CULTURE/COLONY COUNT: CPT

## 2018-06-15 PROCEDURE — 36415 COLL VENOUS BLD VENIPUNCTURE: CPT

## 2018-06-15 PROCEDURE — 80081 OBSTETRIC PANEL INC HIV TSTG: CPT

## 2018-06-15 PROCEDURE — 86787 VARICELLA-ZOSTER ANTIBODY: CPT

## 2018-06-15 NOTE — TELEPHONE ENCOUNTER
Patient is at the labs and orders are not in the computer  She has not had any of her prenatal blood work done at this point  Labs ordered

## 2018-06-16 LAB — HBV SURFACE AG SER QL: NORMAL

## 2018-06-17 LAB — BACTERIA UR CULT: NORMAL

## 2018-06-18 ENCOUNTER — ROUTINE PRENATAL (OUTPATIENT)
Dept: OBGYN CLINIC | Facility: CLINIC | Age: 28
End: 2018-06-18

## 2018-06-18 VITALS — SYSTOLIC BLOOD PRESSURE: 118 MMHG | DIASTOLIC BLOOD PRESSURE: 78 MMHG | WEIGHT: 172 LBS | BODY MASS INDEX: 31.46 KG/M2

## 2018-06-18 DIAGNOSIS — Z87.59 HISTORY OF IUFD: ICD-10-CM

## 2018-06-18 DIAGNOSIS — Z34.82 PRENATAL CARE, SUBSEQUENT PREGNANCY IN SECOND TRIMESTER: Primary | ICD-10-CM

## 2018-06-18 DIAGNOSIS — Z3A.17 17 WEEKS GESTATION OF PREGNANCY: ICD-10-CM

## 2018-06-18 PROBLEM — O09.899 RUBELLA NON-IMMUNE STATUS, ANTEPARTUM: Status: ACTIVE | Noted: 2018-06-18

## 2018-06-18 PROBLEM — Z28.39 RUBELLA NON-IMMUNE STATUS, ANTEPARTUM: Status: ACTIVE | Noted: 2018-06-18

## 2018-06-18 LAB — HIV 1+2 AB+HIV1 P24 AG SERPL QL IA: NORMAL

## 2018-06-18 PROCEDURE — PNV: Performed by: OBSTETRICS & GYNECOLOGY

## 2018-06-18 NOTE — PROGRESS NOTES
It's a BOY! Discussed weaning her daughter  Some pubic bone discomfort - discussed pregnancy  Support belt  Has level II scheduled

## 2018-06-18 NOTE — PROGRESS NOTES
Requests dental letter   Wants to discuss trying to stop breast feeding her daughter  Could not provide urine sample at time of vitals

## 2018-06-19 LAB — VZV IGG SER IA-ACNC: NORMAL

## 2018-07-13 ENCOUNTER — ROUTINE PRENATAL (OUTPATIENT)
Dept: PERINATAL CARE | Facility: MEDICAL CENTER | Age: 28
End: 2018-07-13
Payer: COMMERCIAL

## 2018-07-13 VITALS
HEART RATE: 102 BPM | WEIGHT: 178 LBS | SYSTOLIC BLOOD PRESSURE: 105 MMHG | BODY MASS INDEX: 32.76 KG/M2 | HEIGHT: 62 IN | DIASTOLIC BLOOD PRESSURE: 66 MMHG

## 2018-07-13 DIAGNOSIS — Z36.86 ENCOUNTER FOR ANTENATAL SCREENING FOR CERVICAL LENGTH: ICD-10-CM

## 2018-07-13 DIAGNOSIS — Z87.59 HISTORY OF IUFD: ICD-10-CM

## 2018-07-13 DIAGNOSIS — Z3A.21 21 WEEKS GESTATION OF PREGNANCY: ICD-10-CM

## 2018-07-13 PROCEDURE — 76817 TRANSVAGINAL US OBSTETRIC: CPT | Performed by: OBSTETRICS & GYNECOLOGY

## 2018-07-13 PROCEDURE — 76811 OB US DETAILED SNGL FETUS: CPT | Performed by: OBSTETRICS & GYNECOLOGY

## 2018-07-13 NOTE — PROGRESS NOTES
A transvaginal ultrasound was performed  Sonographer note on use of High Level Disinfection Process (Trophon) for transvaginal probe# 2 used, serial [id number]    Tracee Chaney

## 2018-07-13 NOTE — PATIENT INSTRUCTIONS
She will come back at lunch for me to look at the fetal heart and if she can not she will call to schedule for a scan for missed anatomy in 2 weeks and then another growth scan in 7 weeks  Hx of a prior term stillbirth

## 2018-07-16 ENCOUNTER — ROUTINE PRENATAL (OUTPATIENT)
Dept: OBGYN CLINIC | Facility: CLINIC | Age: 28
End: 2018-07-16

## 2018-07-16 VITALS — DIASTOLIC BLOOD PRESSURE: 70 MMHG | BODY MASS INDEX: 32.56 KG/M2 | WEIGHT: 178 LBS | SYSTOLIC BLOOD PRESSURE: 122 MMHG

## 2018-07-16 DIAGNOSIS — Z34.82 ENCOUNTER FOR SUPERVISION OF OTHER NORMAL PREGNANCY, SECOND TRIMESTER: Primary | ICD-10-CM

## 2018-07-16 DIAGNOSIS — Z3A.21 21 WEEKS GESTATION OF PREGNANCY: ICD-10-CM

## 2018-07-16 DIAGNOSIS — Z87.59 HISTORY OF IUFD: ICD-10-CM

## 2018-07-16 PROCEDURE — PNV: Performed by: OBSTETRICS & GYNECOLOGY

## 2018-07-16 NOTE — PROGRESS NOTES
25-year-old  001 at 20 3 weeks gestational age  She has been feeling well  Had level 2 ultrasound last week  It is a boy! That repeat growth at 28 weeks  Reviewed  labor precautions  Reviewed weight gain  Patient has started walking 30-35 minutes a day  Encouraged her to continue to do this to increase the distance      Follow-up in 4 weeks

## 2018-07-27 ENCOUNTER — ULTRASOUND (OUTPATIENT)
Dept: PERINATAL CARE | Facility: MEDICAL CENTER | Age: 28
End: 2018-07-27
Payer: COMMERCIAL

## 2018-07-27 VITALS
BODY MASS INDEX: 32.94 KG/M2 | DIASTOLIC BLOOD PRESSURE: 62 MMHG | HEIGHT: 62 IN | SYSTOLIC BLOOD PRESSURE: 105 MMHG | WEIGHT: 179 LBS | HEART RATE: 106 BPM

## 2018-07-27 DIAGNOSIS — Z3A.23 23 WEEKS GESTATION OF PREGNANCY: ICD-10-CM

## 2018-07-27 DIAGNOSIS — O99.212 OBESITY AFFECTING PREGNANCY IN SECOND TRIMESTER: Primary | ICD-10-CM

## 2018-07-27 DIAGNOSIS — Z87.59 HISTORY OF IUFD: ICD-10-CM

## 2018-07-27 PROCEDURE — 99212 OFFICE O/P EST SF 10 MIN: CPT | Performed by: OBSTETRICS & GYNECOLOGY

## 2018-07-27 PROCEDURE — 76816 OB US FOLLOW-UP PER FETUS: CPT | Performed by: OBSTETRICS & GYNECOLOGY

## 2018-07-27 RX ORDER — ASPIRIN 81 MG/1
81 TABLET ORAL DAILY
Qty: 90 TABLET | Refills: 3 | Status: SHIPPED | OUTPATIENT
Start: 2018-07-27 | End: 2018-12-18

## 2018-07-27 NOTE — PATIENT INSTRUCTIONS
Thank you for choosing [facility] for your  care today  If you have any questions about your ultrasound or care, please do not hesitate to contact us or your primary obstetrician  Please begin taking aspirin 81mg daily for the prevention of preeclampsia

## 2018-07-27 NOTE — PROGRESS NOTES
[address]: Ms Crystal Pryor was seen today at 23w1d for followup missed anatomy ultrasound  See ultrasound report under "OB Procedures" tab  Please don't hesitate to contact our office with any concerns or questions    Maria M Schilling MD

## 2018-08-13 ENCOUNTER — ROUTINE PRENATAL (OUTPATIENT)
Dept: OBGYN CLINIC | Facility: CLINIC | Age: 28
End: 2018-08-13

## 2018-08-13 VITALS — WEIGHT: 184 LBS | DIASTOLIC BLOOD PRESSURE: 58 MMHG | BODY MASS INDEX: 33.65 KG/M2 | SYSTOLIC BLOOD PRESSURE: 104 MMHG

## 2018-08-13 DIAGNOSIS — Z87.59 HISTORY OF IUFD: ICD-10-CM

## 2018-08-13 DIAGNOSIS — Z34.82 ENCOUNTER FOR SUPERVISION OF OTHER NORMAL PREGNANCY, SECOND TRIMESTER: ICD-10-CM

## 2018-08-13 DIAGNOSIS — Z3A.25 25 WEEKS GESTATION OF PREGNANCY: ICD-10-CM

## 2018-08-13 DIAGNOSIS — Z34.82 ENCOUNTER FOR SUPERVISION OF OTHER NORMAL PREGNANCY IN SECOND TRIMESTER: Primary | ICD-10-CM

## 2018-08-13 PROCEDURE — PNV: Performed by: OBSTETRICS & GYNECOLOGY

## 2018-08-13 NOTE — PROGRESS NOTES
Bret Riojas is a 32y o  year old  at 21w3d for routine prenatal visit  BP:  104/58  GTT and CBC ordered today  History of Fetal Demise: APFS @ 32 weeks, with IOL @ 39 weeks  Daily LDASA until delivery to decrease risk of pre-E  Has not yet started it as per patient

## 2018-09-01 ENCOUNTER — TRANSCRIBE ORDERS (OUTPATIENT)
Dept: LAB | Facility: CLINIC | Age: 28
End: 2018-09-01

## 2018-09-01 ENCOUNTER — LAB (OUTPATIENT)
Dept: LAB | Facility: CLINIC | Age: 28
End: 2018-09-01
Payer: COMMERCIAL

## 2018-09-01 DIAGNOSIS — Z34.82 ENCOUNTER FOR SUPERVISION OF OTHER NORMAL PREGNANCY IN SECOND TRIMESTER: ICD-10-CM

## 2018-09-01 LAB
BASOPHILS # BLD AUTO: 0.01 THOUSANDS/ΜL (ref 0–0.1)
BASOPHILS NFR BLD AUTO: 0 % (ref 0–1)
EOSINOPHIL # BLD AUTO: 0.05 THOUSAND/ΜL (ref 0–0.61)
EOSINOPHIL NFR BLD AUTO: 1 % (ref 0–6)
ERYTHROCYTE [DISTWIDTH] IN BLOOD BY AUTOMATED COUNT: 14.6 % (ref 11.6–15.1)
GLUCOSE 1H P 50 G GLC PO SERPL-MCNC: 150 MG/DL
HCT VFR BLD AUTO: 34.4 % (ref 34.8–46.1)
HGB BLD-MCNC: 10.9 G/DL (ref 11.5–15.4)
IMM GRANULOCYTES # BLD AUTO: 0.08 THOUSAND/UL (ref 0–0.2)
IMM GRANULOCYTES NFR BLD AUTO: 1 % (ref 0–2)
LYMPHOCYTES # BLD AUTO: 1.38 THOUSANDS/ΜL (ref 0.6–4.47)
LYMPHOCYTES NFR BLD AUTO: 25 % (ref 14–44)
MCH RBC QN AUTO: 25.5 PG (ref 26.8–34.3)
MCHC RBC AUTO-ENTMCNC: 31.7 G/DL (ref 31.4–37.4)
MCV RBC AUTO: 80 FL (ref 82–98)
MONOCYTES # BLD AUTO: 0.61 THOUSAND/ΜL (ref 0.17–1.22)
MONOCYTES NFR BLD AUTO: 11 % (ref 4–12)
NEUTROPHILS # BLD AUTO: 3.48 THOUSANDS/ΜL (ref 1.85–7.62)
NEUTS SEG NFR BLD AUTO: 62 % (ref 43–75)
NRBC BLD AUTO-RTO: 0 /100 WBCS
PLATELET # BLD AUTO: 231 THOUSANDS/UL (ref 149–390)
PMV BLD AUTO: 11.1 FL (ref 8.9–12.7)
RBC # BLD AUTO: 4.28 MILLION/UL (ref 3.81–5.12)
WBC # BLD AUTO: 5.61 THOUSAND/UL (ref 4.31–10.16)

## 2018-09-01 PROCEDURE — 36415 COLL VENOUS BLD VENIPUNCTURE: CPT

## 2018-09-01 PROCEDURE — 85025 COMPLETE CBC W/AUTO DIFF WBC: CPT

## 2018-09-01 PROCEDURE — 82950 GLUCOSE TEST: CPT

## 2018-09-02 DIAGNOSIS — R73.09 ABNORMAL GLUCOSE: Primary | ICD-10-CM

## 2018-09-02 NOTE — PROGRESS NOTES
Failed glucola  Needs 3 hour gtt  rx placed    Results released to [facility]

## 2018-09-07 ENCOUNTER — ULTRASOUND (OUTPATIENT)
Dept: PERINATAL CARE | Facility: MEDICAL CENTER | Age: 28
End: 2018-09-07
Payer: COMMERCIAL

## 2018-09-07 VITALS
BODY MASS INDEX: 33.86 KG/M2 | HEIGHT: 62 IN | WEIGHT: 184 LBS | SYSTOLIC BLOOD PRESSURE: 97 MMHG | HEART RATE: 90 BPM | DIASTOLIC BLOOD PRESSURE: 60 MMHG

## 2018-09-07 DIAGNOSIS — Z3A.29 29 WEEKS GESTATION OF PREGNANCY: ICD-10-CM

## 2018-09-07 DIAGNOSIS — O09.293 HISTORY OF STILLBIRTH IN CURRENTLY PREGNANT PATIENT, THIRD TRIMESTER: Primary | ICD-10-CM

## 2018-09-07 PROCEDURE — 76816 OB US FOLLOW-UP PER FETUS: CPT | Performed by: OBSTETRICS & GYNECOLOGY

## 2018-09-07 PROCEDURE — PNV: Performed by: OBSTETRICS & GYNECOLOGY

## 2018-09-07 NOTE — PATIENT INSTRUCTIONS

## 2018-09-10 ENCOUNTER — ROUTINE PRENATAL (OUTPATIENT)
Dept: OBGYN CLINIC | Facility: CLINIC | Age: 28
End: 2018-09-10

## 2018-09-10 VITALS — SYSTOLIC BLOOD PRESSURE: 118 MMHG | BODY MASS INDEX: 34.39 KG/M2 | WEIGHT: 188 LBS | DIASTOLIC BLOOD PRESSURE: 76 MMHG

## 2018-09-10 DIAGNOSIS — Z87.59 HISTORY OF IUFD: ICD-10-CM

## 2018-09-10 DIAGNOSIS — Z34.82 ENCOUNTER FOR SUPERVISION OF OTHER NORMAL PREGNANCY, SECOND TRIMESTER: ICD-10-CM

## 2018-09-10 DIAGNOSIS — O09.293 HISTORY OF STILLBIRTH IN CURRENTLY PREGNANT PATIENT, THIRD TRIMESTER: ICD-10-CM

## 2018-09-10 DIAGNOSIS — Z3A.29 29 WEEKS GESTATION OF PREGNANCY: ICD-10-CM

## 2018-09-10 PROCEDURE — PNV: Performed by: OBSTETRICS & GYNECOLOGY

## 2018-09-10 RX ORDER — FERROUS SULFATE TAB EC 324 MG (65 MG FE EQUIVALENT) 324 (65 FE) MG
324 TABLET DELAYED RESPONSE ORAL
Qty: 90 TABLET | Refills: 1 | Status: SHIPPED | OUTPATIENT
Start: 2018-09-10 | End: 2018-12-18

## 2018-09-10 RX ORDER — DOCUSATE SODIUM 100 MG/1
100 CAPSULE, LIQUID FILLED ORAL 2 TIMES DAILY PRN
Qty: 10 CAPSULE | Refills: 0
Start: 2018-09-10 | End: 2018-10-29

## 2018-09-10 NOTE — PROGRESS NOTES
Trinarious Craig is a 32y o  year old  at 32w2d for routine prenatal visit  BP: 118/76  TW  Glucola and CBC reviewed  Glucola 150  Will get 3hour GTT this weekend  RhoGam needed No  Tdap needed Yes DECLINED  History of fetal demise- has repeat growth @ 32 weeks, and APFS beginning then  [address] reviewed  28 week booklet and birth plan given today  Consent signed

## 2018-09-14 ENCOUNTER — APPOINTMENT (OUTPATIENT)
Dept: LAB | Facility: CLINIC | Age: 28
End: 2018-09-14
Payer: COMMERCIAL

## 2018-09-14 DIAGNOSIS — R73.09 ABNORMAL GLUCOSE: ICD-10-CM

## 2018-09-14 LAB — GLUCOSE P FAST SERPL-MCNC: 96 MG/DL (ref 65–99)

## 2018-09-14 PROCEDURE — 82951 GLUCOSE TOLERANCE TEST (GTT): CPT

## 2018-09-14 PROCEDURE — 36415 COLL VENOUS BLD VENIPUNCTURE: CPT

## 2018-09-17 ENCOUNTER — TELEPHONE (OUTPATIENT)
Dept: OBGYN CLINIC | Facility: CLINIC | Age: 28
End: 2018-09-17

## 2018-09-17 ENCOUNTER — ROUTINE PRENATAL (OUTPATIENT)
Dept: OBGYN CLINIC | Facility: CLINIC | Age: 28
End: 2018-09-17

## 2018-09-17 VITALS — DIASTOLIC BLOOD PRESSURE: 60 MMHG | WEIGHT: 185 LBS | BODY MASS INDEX: 33.84 KG/M2 | SYSTOLIC BLOOD PRESSURE: 106 MMHG

## 2018-09-17 DIAGNOSIS — Z34.82 ENCOUNTER FOR SUPERVISION OF OTHER NORMAL PREGNANCY, SECOND TRIMESTER: ICD-10-CM

## 2018-09-17 DIAGNOSIS — O09.293 HISTORY OF STILLBIRTH IN CURRENTLY PREGNANT PATIENT, THIRD TRIMESTER: ICD-10-CM

## 2018-09-17 DIAGNOSIS — O24.913 DIABETES MELLITUS AFFECTING PREGNANCY IN THIRD TRIMESTER: Primary | ICD-10-CM

## 2018-09-17 DIAGNOSIS — O24.419 GESTATIONAL DIABETES MELLITUS (GDM) AFFECTING PREGNANCY: ICD-10-CM

## 2018-09-17 DIAGNOSIS — Z87.59 HISTORY OF IUFD: ICD-10-CM

## 2018-09-17 DIAGNOSIS — Z3A.30 30 WEEKS GESTATION OF PREGNANCY: ICD-10-CM

## 2018-09-17 PROCEDURE — PNV: Performed by: OBSTETRICS & GYNECOLOGY

## 2018-09-17 NOTE — PROGRESS NOTES
This is a 32 y o   at 30w4d who presents for return OB visit  No complaints  Denies contractions, leakage, bleeding  Endorses fetal movement  BP: 106/60 TWlb  Rh: positive  GDMA1: Reviewed failed fasting for 3 hr - referral to diabetes education placed  Hx IUFD: Scheduled for growth US and AFPS to start at 32 weeks

## 2018-09-21 ENCOUNTER — OFFICE VISIT (OUTPATIENT)
Dept: PERINATAL CARE | Facility: CLINIC | Age: 28
End: 2018-09-21
Payer: COMMERCIAL

## 2018-09-21 VITALS
HEART RATE: 87 BPM | WEIGHT: 187.4 LBS | DIASTOLIC BLOOD PRESSURE: 63 MMHG | HEIGHT: 62 IN | SYSTOLIC BLOOD PRESSURE: 98 MMHG | BODY MASS INDEX: 34.48 KG/M2

## 2018-09-21 DIAGNOSIS — Z34.82 ENCOUNTER FOR SUPERVISION OF OTHER NORMAL PREGNANCY, SECOND TRIMESTER: ICD-10-CM

## 2018-09-21 DIAGNOSIS — Z87.59 HISTORY OF IUFD: ICD-10-CM

## 2018-09-21 DIAGNOSIS — Z3A.30 30 WEEKS GESTATION OF PREGNANCY: ICD-10-CM

## 2018-09-21 DIAGNOSIS — O24.419 GESTATIONAL DIABETES MELLITUS (GDM) AFFECTING PREGNANCY: ICD-10-CM

## 2018-09-21 DIAGNOSIS — O24.913 DIABETES MELLITUS AFFECTING PREGNANCY IN THIRD TRIMESTER: ICD-10-CM

## 2018-09-21 DIAGNOSIS — O09.293 HISTORY OF STILLBIRTH IN CURRENTLY PREGNANT PATIENT, THIRD TRIMESTER: ICD-10-CM

## 2018-09-21 PROBLEM — O24.410 DIET CONTROLLED GESTATIONAL DIABETES MELLITUS (GDM) IN THIRD TRIMESTER: Status: ACTIVE | Noted: 2018-09-21

## 2018-09-21 PROCEDURE — G0108 DIAB MANAGE TRN  PER INDIV: HCPCS

## 2018-09-21 RX ORDER — BLOOD SUGAR DIAGNOSTIC
STRIP MISCELLANEOUS
Qty: 100 EACH | Refills: 4 | Status: SHIPPED | OUTPATIENT
Start: 2018-09-21 | End: 2018-12-18

## 2018-09-21 RX ORDER — LANCETS 33 GAUGE
EACH MISCELLANEOUS
Qty: 100 EACH | Refills: 4 | Status: SHIPPED | OUTPATIENT
Start: 2018-09-21 | End: 2019-09-23

## 2018-09-21 NOTE — PROGRESS NOTES
DATE:  18  RE: Mariaelena Cade    : 1990    RAY: Estimated Date of Delivery: 18    EGA: 31w1d    Dear Dr Angeles Rodriguez,    Thank you for referring your patient to the [facility]  at [address]  The patient received the following education for diabetes and pregnancy   Pathophysiology of diabetes and pregnancy  This includes maternal-fetal complications such as fetal macrosomia,  hypoglycemia, polyhydramnios, increased incidence of  section, pre-term labor and in severe cases, fetal demise and stillbirth   Self-monitoring of blood glucose levels: fasting (goal 60mg/dl to 90mg/dl) and two hours after the start of the meal less (goal less than 120mg/dl)  The patient was provided with a Verio Flex blood glucose meter and supplies   Weight gain during in pregnancy  Based on the patients height of 62 inches, pre-pregnancy weight of 160pounds (BMI 29 3) we would recommend a total weight gain of 15-25 pounds for the pregnancy  o The patients current weight is 85 kg (187 lb 6 4 oz) pounds, and her weight gain to date is 17 pounds  Based on this, we are recommending the patient gain 0-8 lbs  for the remainder of the pregnancy   Medical Nutrition Therapy for Diabetes and Pregnancy:  o Basic review of macronutrients   o Meal pattern should consist of three small meals and three snacks daily  o Carbohydrate gram amounts per meal   o Instructions on how to read a food label  o Appropriate serving size of foods  o Incorporating protein at each meal and snack in the importance of protein in relationship to blood glucose control   o Individualized meal plan: 1900 calorie gestational diabetes diet  o Use of food diary to maintain a meal plan   Ultrasounds every 4 weeks at the [address] to evaluate fetal growth   Sick day guidelines   Breastfeeding guidelines   Post-partum diet recommendations     Exercise Guidelines   Report blood glucose levels to [address] weekly or as directed  o Phone: [phone]  If no response in 24 hours, call [phone]   o Fax: [phone]  o Email: [email or web address]   Follow up: Report blood sugars Monday, 9-24-18  Please contact our office at [phone] if you have questions  Time spent with patient [phone]; time spent face to face counseling greater than 50% of the appointment      Sincerely,     Yaron Mendoza, RN  Diabetes Educator  Diabetes and Pregnancy Program

## 2018-09-26 ENCOUNTER — TELEPHONE (OUTPATIENT)
Dept: PERINATAL CARE | Facility: CLINIC | Age: 28
End: 2018-09-26

## 2018-09-26 NOTE — TELEPHONE ENCOUNTER
Note      Date:  18  RE: Reather Aase  : 1990  Estimated Date of Delivery: 18  EGA: 34H1O  OB/GYN: Enid Segovia    Diet controlled gestational diabetes      Date Fasting Post-  breakfast Post-  lunch Post-  dinner Before bedtime Carbs Comments      120 101          89 98 120 109          106 98 120 109          97 107 120 126          103                    Current regimen:  1900 calorie GDM diet with 3 meals and 3 snacks  Self-monitoring blood glucose 4 times daily fasting and 2 hours post meals  Plan:  Begin insulin  Schedule an insulin education appointment soon  Continue diet and for now change bedtime snack to only 2-3 oz protein and no CHO foods  Examples were provided to the patient  Continue monitoring      Date due to report next:  At appointment     Nesha German blood sugar log via email     Henri Brooks MS, RD, CDE  Diabetes Educator   Diabetes and Pregnancy Program

## 2018-09-27 DIAGNOSIS — O24.414 INSULIN CONTROLLED GESTATIONAL DIABETES MELLITUS (GDM) IN THIRD TRIMESTER: Primary | ICD-10-CM

## 2018-09-27 RX ORDER — INSULIN GLARGINE 100 [IU]/ML
INJECTION, SOLUTION SUBCUTANEOUS
Qty: 15 ML | Refills: 0 | Status: SHIPPED | OUTPATIENT
Start: 2018-09-27 | End: 2018-12-18

## 2018-09-27 RX ORDER — BLOOD SUGAR DIAGNOSTIC
1 STRIP MISCELLANEOUS DAILY
Qty: 100 EACH | Refills: 3 | Status: SHIPPED | OUTPATIENT
Start: 2018-09-27 | End: 2018-11-22

## 2018-09-27 NOTE — TELEPHONE ENCOUNTER
Note      Date:  18  RE: Mando Devi  : 1990  Estimated Date of Delivery: 18  EGA: 09G3H  OB/GYN: Aramis Harris    Insulin controlled gestational diabetes ADDENDUM     Date Fasting Post-  breakfast Post-  lunch Post-  dinner Before bedtime Carbs Comments      120 101          89 98 120 109          106 98 120 109          97 107 120 126          103                    Current regimen:  1900 calorie GDM diet with 3 meals and 3 snacks  Self-monitoring blood glucose 4 times daily fasting and 2 hours post meals  Plan: ADDENDUM  Patient refused to come in for an insulin education appointment since she had given herself insulin with the last pregnancy & delivered   Begin 24 units Lantus at bedtime  Reviewed insulin injection tips via phone  Prescriptions were sent to her pharmacy  HbA1c & CMP prescriptions were emailed to pt  Continue diet and for now change bedtime snack to only 2-3 oz protein and no CHO foods  Examples were provided to the patient  Continue monitoring  Date due to report next:  Tuesday, 10/2/18        Philip Isidro, MS, RD, CDE  Diabetes Educator   Diabetes and Pregnancy Program             I have reviewed the documentation, assessment, and plan from the diabetes educator and agree with the plan as outlined in this note  Reed Silva MD  Attending, Maternal-Fetal Medicine

## 2018-09-28 ENCOUNTER — ROUTINE PRENATAL (OUTPATIENT)
Dept: PERINATAL CARE | Facility: MEDICAL CENTER | Age: 28
End: 2018-09-28
Payer: COMMERCIAL

## 2018-09-28 VITALS
BODY MASS INDEX: 33.68 KG/M2 | DIASTOLIC BLOOD PRESSURE: 56 MMHG | HEART RATE: 89 BPM | SYSTOLIC BLOOD PRESSURE: 93 MMHG | HEIGHT: 62 IN | WEIGHT: 183 LBS

## 2018-09-28 DIAGNOSIS — O24.414 INSULIN CONTROLLED GESTATIONAL DIABETES MELLITUS (GDM) IN THIRD TRIMESTER: Primary | ICD-10-CM

## 2018-09-28 DIAGNOSIS — O09.293 HISTORY OF STILLBIRTH IN CURRENTLY PREGNANT PATIENT, THIRD TRIMESTER: ICD-10-CM

## 2018-09-28 DIAGNOSIS — Z3A.32 32 WEEKS GESTATION OF PREGNANCY: ICD-10-CM

## 2018-09-28 PROCEDURE — 59025 FETAL NON-STRESS TEST: CPT | Performed by: OBSTETRICS & GYNECOLOGY

## 2018-09-28 PROCEDURE — 76815 OB US LIMITED FETUS(S): CPT | Performed by: OBSTETRICS & GYNECOLOGY

## 2018-09-28 NOTE — PROGRESS NOTES
NST procedure and expected outcome explained to patient  Daily fetal kick count discussed with handout given  Patient verbalized understanding of all and was receptive      Bernardino Gaston RN

## 2018-10-01 ENCOUNTER — ROUTINE PRENATAL (OUTPATIENT)
Dept: OBGYN CLINIC | Facility: CLINIC | Age: 28
End: 2018-10-01
Payer: COMMERCIAL

## 2018-10-01 VITALS — DIASTOLIC BLOOD PRESSURE: 64 MMHG | SYSTOLIC BLOOD PRESSURE: 102 MMHG | WEIGHT: 184.2 LBS | BODY MASS INDEX: 33.69 KG/M2

## 2018-10-01 DIAGNOSIS — Z34.82 ENCOUNTER FOR SUPERVISION OF OTHER NORMAL PREGNANCY, SECOND TRIMESTER: ICD-10-CM

## 2018-10-01 DIAGNOSIS — O24.414 INSULIN CONTROLLED GESTATIONAL DIABETES MELLITUS (GDM) IN THIRD TRIMESTER: ICD-10-CM

## 2018-10-01 DIAGNOSIS — O09.293 HISTORY OF STILLBIRTH IN CURRENTLY PREGNANT PATIENT, THIRD TRIMESTER: ICD-10-CM

## 2018-10-01 DIAGNOSIS — Z3A.32 32 WEEKS GESTATION OF PREGNANCY: ICD-10-CM

## 2018-10-01 DIAGNOSIS — Z87.59 HISTORY OF IUFD: ICD-10-CM

## 2018-10-01 PROCEDURE — PNV: Performed by: OBSTETRICS & GYNECOLOGY

## 2018-10-01 PROCEDURE — 59025 FETAL NON-STRESS TEST: CPT | Performed by: OBSTETRICS & GYNECOLOGY

## 2018-10-01 NOTE — PROGRESS NOTES
32 y o    female at [address] [facility] for PNV  BP : 102/64  TWlbs  NST reassuring  No contractions, good FM  Checking her BS

## 2018-10-03 ENCOUNTER — TELEPHONE (OUTPATIENT)
Dept: PERINATAL CARE | Facility: CLINIC | Age: 28
End: 2018-10-03

## 2018-10-05 ENCOUNTER — ULTRASOUND (OUTPATIENT)
Dept: PERINATAL CARE | Facility: MEDICAL CENTER | Age: 28
End: 2018-10-05
Payer: COMMERCIAL

## 2018-10-05 ENCOUNTER — APPOINTMENT (OUTPATIENT)
Dept: PERINATAL CARE | Facility: MEDICAL CENTER | Age: 28
End: 2018-10-05
Payer: COMMERCIAL

## 2018-10-05 VITALS
WEIGHT: 182.8 LBS | SYSTOLIC BLOOD PRESSURE: 96 MMHG | DIASTOLIC BLOOD PRESSURE: 61 MMHG | HEIGHT: 62 IN | BODY MASS INDEX: 33.64 KG/M2 | HEART RATE: 87 BPM

## 2018-10-05 DIAGNOSIS — O24.414 INSULIN CONTROLLED GESTATIONAL DIABETES MELLITUS (GDM) IN THIRD TRIMESTER: ICD-10-CM

## 2018-10-05 DIAGNOSIS — Z87.59 HISTORY OF IUFD: ICD-10-CM

## 2018-10-05 DIAGNOSIS — Z34.82 ENCOUNTER FOR SUPERVISION OF OTHER NORMAL PREGNANCY, SECOND TRIMESTER: ICD-10-CM

## 2018-10-05 DIAGNOSIS — O09.293 HISTORY OF STILLBIRTH IN CURRENTLY PREGNANT PATIENT, THIRD TRIMESTER: ICD-10-CM

## 2018-10-05 DIAGNOSIS — Z3A.33 33 WEEKS GESTATION OF PREGNANCY: ICD-10-CM

## 2018-10-05 PROCEDURE — 99212 OFFICE O/P EST SF 10 MIN: CPT | Performed by: OBSTETRICS & GYNECOLOGY

## 2018-10-05 PROCEDURE — 59025 FETAL NON-STRESS TEST: CPT | Performed by: OBSTETRICS & GYNECOLOGY

## 2018-10-05 PROCEDURE — 76816 OB US FOLLOW-UP PER FETUS: CPT | Performed by: OBSTETRICS & GYNECOLOGY

## 2018-10-05 NOTE — PROGRESS NOTES
NST procedure and expected outcome explained to patient  Daily fetal kick count reviewed and emphasized  Patient verbalized understanding of all and was receptive      Gavi Rosas RN

## 2018-10-09 ENCOUNTER — ROUTINE PRENATAL (OUTPATIENT)
Dept: OBGYN CLINIC | Facility: CLINIC | Age: 28
End: 2018-10-09
Payer: COMMERCIAL

## 2018-10-09 ENCOUNTER — TELEPHONE (OUTPATIENT)
Dept: OBGYN CLINIC | Facility: CLINIC | Age: 28
End: 2018-10-09

## 2018-10-09 VITALS — BODY MASS INDEX: 33.62 KG/M2 | DIASTOLIC BLOOD PRESSURE: 64 MMHG | SYSTOLIC BLOOD PRESSURE: 112 MMHG | WEIGHT: 183.8 LBS

## 2018-10-09 DIAGNOSIS — Z87.59 HISTORY OF IUFD: ICD-10-CM

## 2018-10-09 DIAGNOSIS — Z3A.33 33 WEEKS GESTATION OF PREGNANCY: ICD-10-CM

## 2018-10-09 DIAGNOSIS — O24.414 INSULIN CONTROLLED GESTATIONAL DIABETES MELLITUS (GDM) IN THIRD TRIMESTER: ICD-10-CM

## 2018-10-09 DIAGNOSIS — O09.293 HISTORY OF STILLBIRTH IN CURRENTLY PREGNANT PATIENT, THIRD TRIMESTER: ICD-10-CM

## 2018-10-09 DIAGNOSIS — O09.293 HISTORY OF STILLBIRTH IN CURRENTLY PREGNANT PATIENT, THIRD TRIMESTER: Primary | ICD-10-CM

## 2018-10-09 DIAGNOSIS — Z34.82 ENCOUNTER FOR SUPERVISION OF OTHER NORMAL PREGNANCY, SECOND TRIMESTER: ICD-10-CM

## 2018-10-09 DIAGNOSIS — Z34.83 PRENATAL CARE, SUBSEQUENT PREGNANCY IN THIRD TRIMESTER: ICD-10-CM

## 2018-10-09 PROCEDURE — 59025 FETAL NON-STRESS TEST: CPT | Performed by: OBSTETRICS & GYNECOLOGY

## 2018-10-09 PROCEDURE — PNV: Performed by: OBSTETRICS & GYNECOLOGY

## 2018-10-09 NOTE — TELEPHONE ENCOUNTER
33w5d OB calling states she is suppose to have NST twice weekly and GARETH once a week at [facility]  She is scheduled on Friday with [facility] but thought she was suppose to come in here on Mondays  States the only day she can come in is today  Routing to provider for advise

## 2018-10-09 NOTE — TELEPHONE ENCOUNTER
Spoke with patient given appt for today at 6 will arrive about 1/2 hour early to do NST  Verbalized understanding

## 2018-10-09 NOTE — PROGRESS NOTES
32 y o    female at [facility]  106 for PNV  BP : 112/64  [id number]  NSt reassuring  Denies contractions or bleeding  Taking insulin at night, doing ok

## 2018-10-11 ENCOUNTER — TELEPHONE (OUTPATIENT)
Dept: OBGYN CLINIC | Facility: CLINIC | Age: 28
End: 2018-10-11

## 2018-10-11 ENCOUNTER — OB ABSTRACT (OUTPATIENT)
Dept: PERINATAL CARE | Facility: CLINIC | Age: 28
End: 2018-10-11

## 2018-10-11 NOTE — PROGRESS NOTES
Date:  10/11/18  RE: Hanna Settler  : 1990  Estimated Date of Delivery: 18  EGA: 08P9A  OB/GYN: Vickie Keenan    Insulin controlled gestational diabetes     Date Fasting Post-  breakfast Post-  lunch Post-  dinner Before bedtime Carbs Comments   10-2 80 134 115 119         10-3 100 NR NR 95         10-4 94 98 101 107         10-5 97 NR 96 138         10-6 89  157      10-7 97 100 122 NR      10-8 81 89 96 123      10-9 78 93 NR 94           Last BG reported on 18    Current regimen:  Lantus- 24 units at bedtime  1900 calorie GDM diet with 3 meals and 3 snacks  Self-monitoring blood glucose fasting and 2 hours after start meals  Plan:  Lantus- increase to 26 units  Continue diet and SMBG  Date due to report next:  Tuesday, 10-18-18        Keeley Lassiter, RN, BS, CDE  Diabetes Educator   Diabetes and Pregnancy Program

## 2018-10-11 NOTE — TELEPHONE ENCOUNTER
34wk Ob calling states she would like to travel next week to [place] to visit family  She will be traveling by car  Advised patient this is not a good idea  She is currently doing twice weekly testing and we typically do not recommend travel after 34 weeks  Routing to provider for further advise

## 2018-10-12 ENCOUNTER — ROUTINE PRENATAL (OUTPATIENT)
Dept: PERINATAL CARE | Facility: MEDICAL CENTER | Age: 28
End: 2018-10-12
Payer: COMMERCIAL

## 2018-10-12 VITALS
HEART RATE: 93 BPM | DIASTOLIC BLOOD PRESSURE: 60 MMHG | BODY MASS INDEX: 33.86 KG/M2 | HEIGHT: 62 IN | WEIGHT: 184 LBS | SYSTOLIC BLOOD PRESSURE: 99 MMHG

## 2018-10-12 DIAGNOSIS — O24.414 INSULIN CONTROLLED GESTATIONAL DIABETES MELLITUS (GDM) IN THIRD TRIMESTER: ICD-10-CM

## 2018-10-12 DIAGNOSIS — O09.293 HISTORY OF STILLBIRTH IN CURRENTLY PREGNANT PATIENT, THIRD TRIMESTER: ICD-10-CM

## 2018-10-12 DIAGNOSIS — Z3A.34 34 WEEKS GESTATION OF PREGNANCY: Primary | ICD-10-CM

## 2018-10-12 PROCEDURE — 59025 FETAL NON-STRESS TEST: CPT | Performed by: OBSTETRICS & GYNECOLOGY

## 2018-10-12 PROCEDURE — 76815 OB US LIMITED FETUS(S): CPT | Performed by: OBSTETRICS & GYNECOLOGY

## 2018-10-12 NOTE — TELEPHONE ENCOUNTER
Spoke with patient advised provider does not recommend traveling  Patient states she has decided not to travel  Verbalized understanding

## 2018-10-15 ENCOUNTER — LAB (OUTPATIENT)
Dept: LAB | Facility: CLINIC | Age: 28
End: 2018-10-15
Payer: COMMERCIAL

## 2018-10-15 ENCOUNTER — ROUTINE PRENATAL (OUTPATIENT)
Dept: OBGYN CLINIC | Facility: CLINIC | Age: 28
End: 2018-10-15
Payer: COMMERCIAL

## 2018-10-15 VITALS — SYSTOLIC BLOOD PRESSURE: 118 MMHG | DIASTOLIC BLOOD PRESSURE: 64 MMHG | WEIGHT: 185 LBS | BODY MASS INDEX: 33.84 KG/M2

## 2018-10-15 DIAGNOSIS — Z34.82 ENCOUNTER FOR SUPERVISION OF OTHER NORMAL PREGNANCY, SECOND TRIMESTER: ICD-10-CM

## 2018-10-15 DIAGNOSIS — Z87.59 HISTORY OF IUFD: ICD-10-CM

## 2018-10-15 DIAGNOSIS — O09.293 HISTORY OF STILLBIRTH IN CURRENTLY PREGNANT PATIENT, THIRD TRIMESTER: ICD-10-CM

## 2018-10-15 DIAGNOSIS — O24.414 INSULIN CONTROLLED GESTATIONAL DIABETES MELLITUS (GDM) IN THIRD TRIMESTER: ICD-10-CM

## 2018-10-15 DIAGNOSIS — Z3A.34 34 WEEKS GESTATION OF PREGNANCY: ICD-10-CM

## 2018-10-15 LAB
ALBUMIN SERPL BCP-MCNC: 2.5 G/DL (ref 3.5–5)
ALP SERPL-CCNC: 118 U/L (ref 46–116)
ALT SERPL W P-5'-P-CCNC: 24 U/L (ref 12–78)
ANION GAP SERPL CALCULATED.3IONS-SCNC: 7 MMOL/L (ref 4–13)
AST SERPL W P-5'-P-CCNC: 13 U/L (ref 5–45)
BILIRUB SERPL-MCNC: 0.2 MG/DL (ref 0.2–1)
BUN SERPL-MCNC: 7 MG/DL (ref 5–25)
CALCIUM SERPL-MCNC: 8.9 MG/DL (ref 8.3–10.1)
CHLORIDE SERPL-SCNC: 103 MMOL/L (ref 100–108)
CO2 SERPL-SCNC: 25 MMOL/L (ref 21–32)
CREAT SERPL-MCNC: 0.58 MG/DL (ref 0.6–1.3)
EST. AVERAGE GLUCOSE BLD GHB EST-MCNC: 114 MG/DL
GFR SERPL CREATININE-BSD FRML MDRD: 144 ML/MIN/1.73SQ M
GLUCOSE SERPL-MCNC: 75 MG/DL (ref 65–140)
HBA1C MFR BLD: 5.6 % (ref 4.2–6.3)
POTASSIUM SERPL-SCNC: 3.9 MMOL/L (ref 3.5–5.3)
PROT SERPL-MCNC: 6.8 G/DL (ref 6.4–8.2)
SODIUM SERPL-SCNC: 135 MMOL/L (ref 136–145)

## 2018-10-15 PROCEDURE — 80053 COMPREHEN METABOLIC PANEL: CPT

## 2018-10-15 PROCEDURE — 36415 COLL VENOUS BLD VENIPUNCTURE: CPT | Performed by: DIETITIAN, REGISTERED

## 2018-10-15 PROCEDURE — 59025 FETAL NON-STRESS TEST: CPT | Performed by: OBSTETRICS & GYNECOLOGY

## 2018-10-15 PROCEDURE — PNV: Performed by: OBSTETRICS & GYNECOLOGY

## 2018-10-15 PROCEDURE — 83036 HEMOGLOBIN GLYCOSYLATED A1C: CPT | Performed by: DIETITIAN, REGISTERED

## 2018-10-15 NOTE — PROGRESS NOTES
32 y o    female at 34w4d EGA for PNV  BP : 118/64  TWlbs  Some elevated sugars post meal  Discussed diet  One elevated FBS, not sure why  Never went for HgbA1c/CMP ordered by Dr Aleena Diaz APFS

## 2018-10-18 ENCOUNTER — OB ABSTRACT (OUTPATIENT)
Dept: PERINATAL CARE | Facility: CLINIC | Age: 28
End: 2018-10-18

## 2018-10-18 NOTE — PROGRESS NOTES
Date:  10/18/18  RE: Brooke Doctor    : 1990  Estimated Date of Delivery: 18  EGA: 97Q1Q  OB/GYN: [address]    14/39 93, 94, 97, 100  10/11 96, -  -, -  10/12 -, -, -, -   10/13 98, -, 114, 122  10/14 105, -, 132, 108  (increased to 28 units FYI)   10/15 88, -, 104, 128  10/16 89, 91, 105, -  10/17 88, 93, 133, 98   10/18 86,   Blood glucose values copied from patient e-mail  Current regimen:  Lantus- 28 units at bedtime as per patient log   calorie GDM diet with 3 meals and 3 snacks  Self-monitoring blood glucose fasting and 2 hours after start meals      Plan:  Continue current regimen    HbA1c- 5 6 and CMP- WNL from 10-15-18    Date due to report next:  Thursday, 10-25-18    Orville Artis RN  Diabetes Educator   Diabetes and Pregnancy Program

## 2018-10-19 ENCOUNTER — ROUTINE PRENATAL (OUTPATIENT)
Dept: PERINATAL CARE | Facility: MEDICAL CENTER | Age: 28
End: 2018-10-19
Payer: COMMERCIAL

## 2018-10-19 VITALS
HEIGHT: 62 IN | HEART RATE: 105 BPM | SYSTOLIC BLOOD PRESSURE: 89 MMHG | DIASTOLIC BLOOD PRESSURE: 55 MMHG | WEIGHT: 185 LBS | BODY MASS INDEX: 34.04 KG/M2

## 2018-10-19 DIAGNOSIS — Z3A.35 35 WEEKS GESTATION OF PREGNANCY: ICD-10-CM

## 2018-10-19 DIAGNOSIS — O09.293 HISTORY OF STILLBIRTH IN CURRENTLY PREGNANT PATIENT, THIRD TRIMESTER: ICD-10-CM

## 2018-10-19 DIAGNOSIS — O24.414 INSULIN CONTROLLED GESTATIONAL DIABETES MELLITUS (GDM) IN THIRD TRIMESTER: ICD-10-CM

## 2018-10-19 PROCEDURE — 76815 OB US LIMITED FETUS(S): CPT | Performed by: OBSTETRICS & GYNECOLOGY

## 2018-10-19 PROCEDURE — 59025 FETAL NON-STRESS TEST: CPT | Performed by: OBSTETRICS & GYNECOLOGY

## 2018-10-22 ENCOUNTER — ROUTINE PRENATAL (OUTPATIENT)
Dept: OBGYN CLINIC | Facility: CLINIC | Age: 28
End: 2018-10-22
Payer: COMMERCIAL

## 2018-10-22 VITALS — BODY MASS INDEX: 33.18 KG/M2 | SYSTOLIC BLOOD PRESSURE: 118 MMHG | DIASTOLIC BLOOD PRESSURE: 66 MMHG | WEIGHT: 181.4 LBS

## 2018-10-22 DIAGNOSIS — Z87.59 HISTORY OF IUFD: ICD-10-CM

## 2018-10-22 DIAGNOSIS — O24.414 INSULIN CONTROLLED GESTATIONAL DIABETES MELLITUS (GDM) IN THIRD TRIMESTER: ICD-10-CM

## 2018-10-22 DIAGNOSIS — O09.293 HISTORY OF STILLBIRTH IN CURRENTLY PREGNANT PATIENT, THIRD TRIMESTER: ICD-10-CM

## 2018-10-22 DIAGNOSIS — Z34.83 ENCOUNTER FOR SUPERVISION OF OTHER NORMAL PREGNANCY IN THIRD TRIMESTER: Primary | ICD-10-CM

## 2018-10-22 PROCEDURE — 87653 STREP B DNA AMP PROBE: CPT | Performed by: OBSTETRICS & GYNECOLOGY

## 2018-10-22 PROCEDURE — PNV: Performed by: OBSTETRICS & GYNECOLOGY

## 2018-10-22 PROCEDURE — 59025 FETAL NON-STRESS TEST: CPT | Performed by: OBSTETRICS & GYNECOLOGY

## 2018-10-25 LAB — GP B STREP DNA SPEC QL NAA+PROBE: NORMAL

## 2018-10-26 ENCOUNTER — APPOINTMENT (OUTPATIENT)
Dept: PERINATAL CARE | Facility: MEDICAL CENTER | Age: 28
End: 2018-10-26
Payer: COMMERCIAL

## 2018-10-26 ENCOUNTER — ULTRASOUND (OUTPATIENT)
Dept: PERINATAL CARE | Facility: MEDICAL CENTER | Age: 28
End: 2018-10-26
Payer: COMMERCIAL

## 2018-10-26 ENCOUNTER — OB ABSTRACT (OUTPATIENT)
Dept: PERINATAL CARE | Facility: CLINIC | Age: 28
End: 2018-10-26

## 2018-10-26 VITALS
BODY MASS INDEX: 33.31 KG/M2 | HEART RATE: 94 BPM | HEIGHT: 62 IN | WEIGHT: 181 LBS | DIASTOLIC BLOOD PRESSURE: 58 MMHG | SYSTOLIC BLOOD PRESSURE: 94 MMHG

## 2018-10-26 DIAGNOSIS — O24.414 INSULIN CONTROLLED GESTATIONAL DIABETES MELLITUS (GDM) IN THIRD TRIMESTER: ICD-10-CM

## 2018-10-26 DIAGNOSIS — O09.293 HISTORY OF STILLBIRTH IN CURRENTLY PREGNANT PATIENT, THIRD TRIMESTER: ICD-10-CM

## 2018-10-26 DIAGNOSIS — Z3A.36 36 WEEKS GESTATION OF PREGNANCY: ICD-10-CM

## 2018-10-26 DIAGNOSIS — O35.8XX0 ANOMALY OF FETAL HEART AFFECTING SINGLETON PREGNANCY, ANTEPARTUM: ICD-10-CM

## 2018-10-26 DIAGNOSIS — O36.5930 POOR FETAL GROWTH AFFECTING MANAGEMENT OF MOTHER IN THIRD TRIMESTER, SINGLE OR UNSPECIFIED FETUS: Primary | ICD-10-CM

## 2018-10-26 PROCEDURE — 76821 MIDDLE CEREBRAL ARTERY ECHO: CPT | Performed by: OBSTETRICS & GYNECOLOGY

## 2018-10-26 PROCEDURE — 76818 FETAL BIOPHYS PROFILE W/NST: CPT | Performed by: OBSTETRICS & GYNECOLOGY

## 2018-10-26 PROCEDURE — 76827 ECHO EXAM OF FETAL HEART: CPT | Performed by: OBSTETRICS & GYNECOLOGY

## 2018-10-26 PROCEDURE — 76825 ECHO EXAM OF FETAL HEART: CPT | Performed by: OBSTETRICS & GYNECOLOGY

## 2018-10-26 PROCEDURE — 93325 DOPPLER ECHO COLOR FLOW MAPG: CPT | Performed by: OBSTETRICS & GYNECOLOGY

## 2018-10-26 PROCEDURE — 99215 OFFICE O/P EST HI 40 MIN: CPT | Performed by: OBSTETRICS & GYNECOLOGY

## 2018-10-26 PROCEDURE — 76816 OB US FOLLOW-UP PER FETUS: CPT | Performed by: OBSTETRICS & GYNECOLOGY

## 2018-10-26 PROCEDURE — 76820 UMBILICAL ARTERY ECHO: CPT | Performed by: OBSTETRICS & GYNECOLOGY

## 2018-10-26 NOTE — PROGRESS NOTES
NST procedure and expected outcome explained to patient  Daily fetal kick count reviewed and emphasized  Patient verbalized understanding of all and was receptive      Salud Morris RN

## 2018-10-26 NOTE — PROGRESS NOTES
Date:  10/26/18  RE: Kj Ramirez    : 1990  Estimated Date of Delivery: 18  EGA: 62J4O  OB/GYN: Alyse Pickard    Blood glucose values copied from patient e-mail  10/18 86, -, 112, 127  10/19 -, -, 110, 95  10/20 101, 97,106, -  10/21 82, -, 110, 113  10/22 -, -, 125, 133  10/23 98 (no insulin), - (didnt eat breakfast), 113, 129  10/24 86, 90, 132, 108  Blood glucose values copied from patient e-mail  Current regimen:  Lantus- 28 units at bedtime as per patient log   calorie GDM diet with 3 meals and 3 snacks  Self-monitoring blood glucose fasting and 2 hours after start meals  10/19 US: GARETH appeared normal  10/5 US: fetal growth appeared normal  Plan:  Continue current regimen  Advised patient when repeating a similar meal that caused blood glucose >119mg/dl to decrease 1 carbohydrate serving   and increase 1 protein serving  Also, recommended not skipping meals and to set a phone alarm to not forget Lantus     HbA1c- 5 6 and CMP- WNL from 10-15-18    Date due to report next:  Tuesday, 10-30-18    Payton Schwartz, RD,LDN,CDE  Diabetes Educator   Diabetes and Pregnancy Program

## 2018-10-27 PROBLEM — O36.5930 POOR FETAL GROWTH AFFECTING MANAGEMENT OF MOTHER IN THIRD TRIMESTER: Status: ACTIVE | Noted: 2018-10-27

## 2018-10-27 PROBLEM — O35.8XX0 ANOMALY OF FETAL HEART AFFECTING SINGLETON PREGNANCY, ANTEPARTUM: Status: ACTIVE | Noted: 2018-10-27

## 2018-10-29 ENCOUNTER — ROUTINE PRENATAL (OUTPATIENT)
Dept: OBGYN CLINIC | Facility: CLINIC | Age: 28
End: 2018-10-29
Payer: COMMERCIAL

## 2018-10-29 ENCOUNTER — HOSPITAL ENCOUNTER (OUTPATIENT)
Facility: HOSPITAL | Age: 28
Discharge: HOME | End: 2018-10-29
Attending: OBSTETRICS & GYNECOLOGY | Admitting: OBSTETRICS & GYNECOLOGY
Payer: COMMERCIAL

## 2018-10-29 VITALS — DIASTOLIC BLOOD PRESSURE: 64 MMHG | BODY MASS INDEX: 33.47 KG/M2 | SYSTOLIC BLOOD PRESSURE: 104 MMHG | WEIGHT: 183 LBS

## 2018-10-29 VITALS
HEIGHT: 62 IN | DIASTOLIC BLOOD PRESSURE: 52 MMHG | BODY MASS INDEX: 33.68 KG/M2 | SYSTOLIC BLOOD PRESSURE: 99 MMHG | WEIGHT: 183 LBS | RESPIRATION RATE: 18 BRPM | TEMPERATURE: 98.4 F | HEART RATE: 94 BPM

## 2018-10-29 DIAGNOSIS — O35.8XX0 ANOMALY OF FETAL HEART AFFECTING SINGLETON PREGNANCY, ANTEPARTUM: ICD-10-CM

## 2018-10-29 DIAGNOSIS — Z3A.37 37 WEEKS GESTATION OF PREGNANCY: ICD-10-CM

## 2018-10-29 DIAGNOSIS — Z34.82 ENCOUNTER FOR SUPERVISION OF OTHER NORMAL PREGNANCY, SECOND TRIMESTER: ICD-10-CM

## 2018-10-29 DIAGNOSIS — Z87.59 HISTORY OF IUFD: ICD-10-CM

## 2018-10-29 DIAGNOSIS — O24.414 INSULIN CONTROLLED GESTATIONAL DIABETES MELLITUS (GDM) IN THIRD TRIMESTER: ICD-10-CM

## 2018-10-29 DIAGNOSIS — O09.293 HISTORY OF STILLBIRTH IN CURRENTLY PREGNANT PATIENT, THIRD TRIMESTER: ICD-10-CM

## 2018-10-29 PROCEDURE — 99213 OFFICE O/P EST LOW 20 MIN: CPT

## 2018-10-29 PROCEDURE — 59025 FETAL NON-STRESS TEST: CPT | Performed by: OBSTETRICS & GYNECOLOGY

## 2018-10-29 PROCEDURE — PNV: Performed by: OBSTETRICS & GYNECOLOGY

## 2018-10-29 NOTE — PROGRESS NOTES
32 y o    female at 37w2d EGA for PNV  BP : 104/64 TWlbs  Discussed recent fetal diagnosis  Patient and her partner are interested in delivering at [facility]  Baby may need surgery in the 1st few days of life  Plan is to deliver around 39 weeks  Discussed this with Dr Blank hensley who will facilitate setting up the transfer of care with [facility]  Patient reports good fetal movement  Denies contractions or bleeding  Patient is maintaining a positive outlook regarding the recent diagnosis and plan of care for her baby  Nonstress test done in office shows a baseline of 140 with moderate variability 1 mild variable the 120s  She had 1 acceleration after being monitored for 1 hr therefore she was sent to Labor and delivery for extended monitoring

## 2018-10-30 NOTE — DISCHARGE INSTRUCTIONS
Pregnancy at [address] to [address]:   You are considered full term at the beginning of 37 weeks  Your breathing may be easier if your baby has moved down into a head-down position  You may need to urinate more often because the baby may be pressing on your bladder  You may also feel more discomfort and get tired easily  DISCHARGE INSTRUCTIONS:   Seek care immediately if:   · You develop a severe headache that does not go away  · You have new or increased vision changes, such as blurred or spotted vision  · You have new or increased swelling in your face or hands  · You have vaginal spotting or bleeding  · Your water broke or you feel warm water gushing or trickling from your vagina  Contact your healthcare provider if:   · You have more than 5 contractions in 1 hour  · You notice any changes in your baby's movements  · You have abdominal cramps, pressure, or tightening  · You have a change in vaginal discharge  · You have chills or a fever  · You have vaginal itching, burning, or pain  · You have yellow, green, white, or foul-smelling vaginal discharge  · You have pain or burning when you urinate, less urine than usual, or pink or bloody urine  · You have questions or concerns about your condition or care  How to care for yourself at this stage of your pregnancy:   · Eat a variety of healthy foods  Healthy foods include fruits, vegetables, whole-grain breads, low-fat dairy foods, beans, lean meats, and fish  Drink liquids as directed  Ask how much liquid to drink each day and which liquids are best for you  Limit caffeine to less than 200 milligrams each day  Limit your intake of fish to 2 servings each week  Choose fish low in mercury such as canned light tuna, shrimp, salmon, cod, or tilapia  Do not  eat fish high in mercury such as swordfish, tilefish, arianna mackerel, and shark  · Take prenatal vitamins as directed    Your need for certain vitamins and minerals, such as folic acid, increases during pregnancy  Prenatal vitamins provide some of the extra vitamins and minerals you need  Prenatal vitamins may also help to decrease the risk of certain birth defects  · Rest as needed  Put your feet up if you have swelling in your ankles and feet  · Do not smoke  If you smoke, it is never too late to quit  Smoking increases your risk of a miscarriage and other health problems during your pregnancy  Smoking can cause your baby to be born too early or weigh less at birth  Ask your healthcare provider for information if you need help quitting  · Do not drink alcohol  Alcohol passes from your body to your baby through the placenta  It can affect your baby's brain development and cause fetal alcohol syndrome (FAS)  FAS is a group of conditions that causes mental, behavior, and growth problems  · Talk to your healthcare provider before you take any medicines  Many medicines may harm your baby if you take them when you are pregnant  Do not take any medicines, vitamins, herbs, or supplements without first talking to your healthcare provider  Never use illegal or street drugs (such as marijuana or cocaine) while you are pregnant  · Talk to your healthcare provider before you travel  You may not be able to travel in an airplane after 36 weeks  He may also recommend that you avoid long road trips  Safety tips:   · Avoid hot tubs and saunas  Do not use a hot tub or sauna while you are pregnant, especially during your first trimester  Hot tubs and saunas may raise your baby's temperature and increase the risk of birth defects  · Avoid toxoplasmosis  This is an infection caused by eating raw meat or being around infected cat feces  It can cause birth defects, miscarriages, and other problems  Wash your hands after you touch raw meat  Make sure any meat is well-cooked before you eat it  Avoid raw eggs and unpasteurized milk   Use gloves or ask someone else to clean your cat's litter box while you are pregnant  · Ask your healthcare provider about travel  The most comfortable time to travel is during the second trimester  Ask your healthcare provider if you can travel after 36 weeks  You may not be able to travel in an airplane after 36 weeks  He may also recommend that you avoid long road trips  Changes that are happening with your baby:  By 38 weeks, your baby may weigh between 6 and 9 pounds  Your baby may be about 14 inches long from the top of the head to the rump (baby's bottom)  Your baby hears well enough to know your voice  As your baby gets larger, you may feel fewer kicks and more stretching and rolling  Your baby may move into a head-down position  Your baby will also rest lower in your abdomen  What you need to know about prenatal care: Your healthcare provider will check your blood pressure and weight  You may also need the following:  · A urine test  may also be done to check for sugar and protein  These can be signs of gestational diabetes or infection  Protein in your urine may also be a sign of preeclampsia  Preeclampsia is a condition that can develop during week 20 or later of your pregnancy  It causes high blood pressure, and it can cause problems with your kidneys and other organs  · A blood test  may be done to check for anemia (low iron level)  · A Tdap vaccine  may be recommended by your healthcare provider  · A group B strep test  is a test that is done to check for group B strep infection  Group B strep is a type of bacteria that may be found in the vagina or rectum  It can be passed to your baby during delivery if you have it  Your healthcare provider will take swab your vagina or rectum and send the sample to the lab for tests  · Fundal height  is a measurement of your uterus to check your baby's growth  This number is usually the same as the number of weeks that you have been pregnant   Your healthcare provider may also check your baby's position  · Your baby's heart rate  will be checked  © 2017 [address] [employer] Information is for End User's use only and may not be sold, redistributed or otherwise used for commercial purposes  All illustrations and images included in CareNotes® are the copyrighted property of A D A M , Inc  or Nirmal Graham  The above information is an  only  It is not intended as medical advice for individual conditions or treatments  Talk to your doctor, nurse or pharmacist before following any medical regimen to see if it is safe and effective for you

## 2018-10-30 NOTE — PROGRESS NOTES
Triage Note - OB  Yamilex Cantu 32 y o  female MRN: [id number]  Unit/Bed#: LD PACU-02 Encounter: [id number]    OB TRIAGE NOTE  Yamilex Cantu  [id number]  10/29/2018  11:29 PM  LD PACU/LD PACU-02    ASSESS:  32 y o   36w4d with reactive NST and overall reassuring fetal monitoring  PLAN  - Twice weekly NST   - Close follow-up with   - Discharge instructions  · Patient instructed to call if experiencing worsening contractions, vaginal bleeding, loss of fluid or decreased fetal movement  · Will follow up with OBGYN on   D/w Dr Wilber Guerrero  ______________    SUBJECTIVE    RAY: Estimated Date of Delivery: 18    HPI Chronology:  32 y o   36w4d presents from clinic for extended fetal monitoring for non-reactive NST  In the office, tracing showed a baseline of 140 w/ moderate variability, one mild variable in the 120s and one acceleration  Pregnancy complicated by IUGR and recently diagnosed fetal cardiac abnormality that will require surgical repair in the first few days of life  Delivery is being recommended at [facility] at 39 weeks  Patient also has a hx of prior fetal demise at 43 weeks in   Contractions: no  Leakage: no  Bleeding: no  Fetal Movement: yes  Pelvic pain: no      Vitals:   BP 99/52 (BP Location: Right arm)   Pulse 94   Temp 98 4 °F (36 9 °C) (Temporal)   Resp 18   Ht 5' 2" (1 575 m)   Wt 83 kg (183 lb)   LMP 02/15/2018 (Exact Date)   BMI 33 47 kg/m²   Body mass index is 33 47 kg/m²  Physical Exam   Constitutional: She is oriented to person, place, and time  She appears well-developed and well-nourished  No distress  HENT:   Head: Normocephalic and atraumatic  Cardiovascular: Normal rate  Pulmonary/Chest: Effort normal    Neurological: She is alert and oriented to person, place, and time  Skin: Skin is warm and dry  Psychiatric: She has a normal mood and affect   Her behavior is normal        FHT:  Baseline Rate: 135 bpm  Variability: Moderate 6-25 bpm  Accelerations: 15 x 15 or greater, At variable times  Decelerations: None  FHR Category: Category I    TOCO:   Contraction Frequency (minutes): none          Pat Cunha MD  10/29/2018  11:29 PM

## 2018-10-31 ENCOUNTER — DOCUMENTATION (OUTPATIENT)
Dept: PERINATAL CARE | Facility: CLINIC | Age: 28
End: 2018-10-31

## 2018-10-31 NOTE — PROGRESS NOTES
Contacted [facility] fetal surgery and diagnosis center  discussed with clinical coordinator Tao Hartman- pt care to be transferred as outpatient and for eventual delivery due to fetal coarctation of aorta  All clinicals faxed and Dr Amanda Santacruz aware of transfer  Will contact [facility] and update clinical coordinator Nessa regarding above

## 2018-11-07 ENCOUNTER — DOCUMENTATION (OUTPATIENT)
Dept: PERINATAL CARE | Facility: CLINIC | Age: 28
End: 2018-11-07

## 2018-11-07 NOTE — PROGRESS NOTES
Fetal echo report received from [facility]  given to Dr Valentine Hagen to review

## 2018-11-12 ENCOUNTER — TELEPHONE (OUTPATIENT)
Dept: OBGYN CLINIC | Facility: CLINIC | Age: 28
End: 2018-11-12

## 2018-11-12 ENCOUNTER — TRANSITIONAL CARE MANAGEMENT (OUTPATIENT)
Dept: FAMILY MEDICINE CLINIC | Facility: CLINIC | Age: 28
End: 2018-11-12

## 2018-11-12 NOTE — TELEPHONE ENCOUNTER
6d PP c/o hemorrhoid pain  She is taking ibuprofen, hemorrhoid cream from hospital  Advised sitz bath, donut to sit on to help relieve pressure  Routing to provider for further advise

## 2018-11-13 NOTE — TELEPHONE ENCOUNTER
Agree with above  She can try preparation H (if that isn't what she already has) and make sure she's using a stool softener to avoid straining, that will make things worse

## 2018-11-15 NOTE — TELEPHONE ENCOUNTER
Reviewed with patient to try preparation H if she isn't already using and make sure taking stool softer so she can avoid straining  Pt states she is taking colace twice a day and things are getting better  Advised to call with any concerns

## 2018-11-19 ENCOUNTER — TELEPHONE (OUTPATIENT)
Dept: OBGYN CLINIC | Facility: CLINIC | Age: 28
End: 2018-11-19

## 2018-11-19 NOTE — TELEPHONE ENCOUNTER
----- Message from [facility] sent at 90/92/4454  4:19 PM EST -----  If we havent already, can you check in on Maygen?  Delivered at [facility]

## 2018-11-19 NOTE — TELEPHONE ENCOUNTER
Left message on voicemail,  calling to see how she is doing  Please call the office   16 male 37w5d at [facility]

## 2018-11-20 NOTE — TELEPHONE ENCOUNTER
Thechari Putnam from [facility] states Lorena Moncada from [facility] [place]- Dr Yasir Herman would like to know how Raenell Sprawls and baby are doing   Please call after speaking with patient [phone] or [phone] COUGH

## 2018-11-21 NOTE — TELEPHONE ENCOUNTER
How are you feeling? Fine  Are you having any vaginal bleeding? Light vaginal bleeding  Have you had any problems voiding? No  Have you had any problems moving your bowels? No   Type of Delivery?   Vaginal delivery - any issues with healing? No      I see you had a baby (boy)   How is the baby doing? Baby is still at [address] in NICU - no longer a heart defect - does not need surgery  Prior to release from NICU he had blood in stool and tested it was positive for [facility]  So he stayed for antibiotics now just slowly introducing milk then once he is okay with that they will release  Are you breast/bottle feeding? Breast pumping  How is that going? Okay  Have you seen lactation consultant? Yes at [facility]    Are you having any baby blues? No  Do you have any help at home? Yes  EPDS - 3    Follow up appointment scheduled - 18  Patient advised to call the office for abnormal bleeding, mastitis, infection, any signs of postpartum depression  Patient verbalized understanding  Routing to provider to update  Advised patient Dr Mekhi Mcdowell office is calling to check in - pt gave permission to update his office

## 2018-12-18 ENCOUNTER — POSTPARTUM VISIT (OUTPATIENT)
Dept: OBGYN CLINIC | Facility: CLINIC | Age: 28
End: 2018-12-18
Payer: COMMERCIAL

## 2018-12-18 VITALS — WEIGHT: 173 LBS | DIASTOLIC BLOOD PRESSURE: 60 MMHG | SYSTOLIC BLOOD PRESSURE: 110 MMHG | BODY MASS INDEX: 31.64 KG/M2

## 2018-12-18 DIAGNOSIS — Z87.59 HISTORY OF IUFD: ICD-10-CM

## 2018-12-18 DIAGNOSIS — O24.414 INSULIN CONTROLLED GESTATIONAL DIABETES MELLITUS (GDM) IN THIRD TRIMESTER: ICD-10-CM

## 2018-12-18 PROBLEM — Z28.21 INFLUENZA VACCINATION DECLINED BY PATIENT: Status: ACTIVE | Noted: 2018-11-02

## 2018-12-18 PROBLEM — O35.8XX0 ANOMALY OF FETAL HEART AFFECTING SINGLETON PREGNANCY, ANTEPARTUM: Status: RESOLVED | Noted: 2018-10-27 | Resolved: 2018-12-18

## 2018-12-18 PROBLEM — Z3A.36 36 WEEKS GESTATION OF PREGNANCY: Status: RESOLVED | Noted: 2018-07-16 | Resolved: 2018-12-18

## 2018-12-18 PROBLEM — Z34.82 ENCOUNTER FOR SUPERVISION OF OTHER NORMAL PREGNANCY, SECOND TRIMESTER: Status: RESOLVED | Noted: 2018-07-16 | Resolved: 2018-12-18

## 2018-12-18 PROBLEM — O36.5930 POOR FETAL GROWTH AFFECTING MANAGEMENT OF MOTHER IN THIRD TRIMESTER: Status: RESOLVED | Noted: 2018-10-27 | Resolved: 2018-12-18

## 2018-12-18 PROBLEM — O09.293 HISTORY OF STILLBIRTH IN CURRENTLY PREGNANT PATIENT, THIRD TRIMESTER: Status: RESOLVED | Noted: 2018-09-07 | Resolved: 2018-12-18

## 2018-12-18 PROCEDURE — 99024 POSTOP FOLLOW-UP VISIT: CPT | Performed by: OBSTETRICS & GYNECOLOGY

## 2018-12-18 NOTE — PROGRESS NOTES
Assessment/Plan     Normal postpartum exam      1  Contraception: IUD - will schedule for insertion of liletta  2  Annual exam due in 2019  3 GDM - hgb A1C and fasting blood sugar ordered  4  Increase activity as tolerated, may resume all normal activity  5  Anticipated return to work: 6 - 12 weeks post partum  Wan Mendez is a 25 y o  female who presents for a postpartum visit  She is 6 weeks postpartum following a spontaneous vaginal delivery  I have fully reviewed the prenatal and intrapartum course  The delivery was at 40 gestational weeks  Anesthesia: none  Laceration: none  She was induced at [facility] due to possible need for heart surgery for the baby at birth  Baby did not need any surgery  Bleeding no bleeding, felt like she had a period the other day  Bowel function is normal  Bladder function is normal  Patient has been sexually active, without protection  Desired contraception method is IUD  Postpartum depression screening: negative  EPDS : 5    Pediatrician: Eve  Baby's course has been complicated by stay at [facility] for 24 days, but he is doing well now  Baby is feeding by breast     Last Pap : 2018 ; atypical squamous cellularity of undetermined significance (ASCUS), HPV negative,   Gestational Diabetes: yes  Pregnancy Complications: none    The following portions of the patient's history were reviewed and updated as appropriate: allergies, current medications, past family history, past medical history, past social history, past surgical history and problem list       Current Outpatient Prescriptions:     ONETOUCH DELICA LANCETS 02O MISC, Use 4 a day and as directed   (Patient not taking: Reported on 12/18/2018 ), Disp: 100 each, Rfl: 4    Prenatal MV-Min-Fe Fum-FA-DHA (PRENATAL 1 PO), Take by mouth, Disp: , Rfl:     No Known Allergies    Review of Systems  Constitutional: no fever, feels well  Breasts: no complaints of breast pain, breast lump, or nipple discharge  Gastrointestinal: no complaints nausea, vomiting  Genitourinary: as noted in HPI    Neurological: no complaints of headache      Objective      /60   Wt 78 5 kg (173 lb)   LMP 02/15/2018 (Exact Date)   BMI 31 64 kg/m²     OBGyn Exam  General: alert and oriented, in no acute distress

## 2019-01-15 ENCOUNTER — PROCEDURE VISIT (OUTPATIENT)
Dept: OBGYN CLINIC | Facility: CLINIC | Age: 29
End: 2019-01-15
Payer: COMMERCIAL

## 2019-01-15 VITALS — WEIGHT: 173 LBS | DIASTOLIC BLOOD PRESSURE: 64 MMHG | BODY MASS INDEX: 31.64 KG/M2 | SYSTOLIC BLOOD PRESSURE: 108 MMHG

## 2019-01-15 DIAGNOSIS — Z30.430 ENCOUNTER FOR IUD INSERTION: Primary | ICD-10-CM

## 2019-01-15 LAB — SL AMB POCT URINE HCG: NEGATIVE

## 2019-01-15 PROCEDURE — 81025 URINE PREGNANCY TEST: CPT | Performed by: OBSTETRICS & GYNECOLOGY

## 2019-01-15 PROCEDURE — 58300 INSERT INTRAUTERINE DEVICE: CPT | Performed by: OBSTETRICS & GYNECOLOGY

## 2019-01-15 NOTE — PROGRESS NOTES
Iud insertions  Date/Time: 1/15/2019 10:59 AM  Performed by: Zelda Mckenna  Authorized by: Zelda Mckenna     Consent:     Consent obtained:  Verbal and written    Consent given by:  Patient    Procedure risks and benefits discussed: yes      Patient questions answered: yes      Patient agrees, verbalizes understanding, and wants to proceed: yes      Educational handouts given: yes      Instructions and paperwork completed: yes    Procedure:     Pelvic exam performed: yes      Negative urine pregnancy test: yes (Is exclusively breastfeeding, using condoms as backup)      Cervix cleaned and prepped: yes      Speculum placed in vagina: yes      Tenaculum applied to cervix: yes      Uterus sounded: yes      IUD inserted with no complications: yes      IUD type: Arturo Bibles  Strings trimmed: yes      Uterus sound depth (cm):  10  Post-procedure:     Patient tolerated procedure well: yes    Comments: Will follow up in 4 weeks for string check

## 2019-09-23 ENCOUNTER — OFFICE VISIT (OUTPATIENT)
Dept: OBGYN CLINIC | Facility: CLINIC | Age: 29
End: 2019-09-23
Payer: COMMERCIAL

## 2019-09-23 VITALS — WEIGHT: 173 LBS | SYSTOLIC BLOOD PRESSURE: 122 MMHG | DIASTOLIC BLOOD PRESSURE: 64 MMHG | BODY MASS INDEX: 31.64 KG/M2

## 2019-09-23 DIAGNOSIS — Z30.431 IUD CHECK UP: Primary | ICD-10-CM

## 2019-09-23 DIAGNOSIS — N92.6 IRREGULAR MENSTRUAL BLEEDING: ICD-10-CM

## 2019-09-23 PROBLEM — Z28.21 INFLUENZA VACCINATION DECLINED BY PATIENT: Status: RESOLVED | Noted: 2018-11-02 | Resolved: 2019-09-23

## 2019-09-23 LAB — SL AMB POCT URINE HCG: NEGATIVE

## 2019-09-23 PROCEDURE — 81025 URINE PREGNANCY TEST: CPT | Performed by: OBSTETRICS & GYNECOLOGY

## 2019-09-23 PROCEDURE — 99213 OFFICE O/P EST LOW 20 MIN: CPT | Performed by: OBSTETRICS & GYNECOLOGY

## 2019-09-23 NOTE — PROGRESS NOTES
Assessment/Plan    IUD check up  - appropriately positioned  - f/u in  for annual    Irregular menstrual bleeding  -     POCT urine HCG          Subjective   Gabbi Lee is an 25 y o  woman who presents for IUD string check  Patient has the following IUD: Saji Fair  She reports 4 days of bleeding last week  She had not had any other bleeding since insertion  Pt has not been able to feel strings    Pt does report pain issues  Patient Active Problem List   Diagnosis    History of IUFD    Rubella non-immune status, antepartum    Gestational diabetes mellitus in pregnancy, insulin controlled    History of shoulder dystocia in prior pregnancy       Menstrual History:  OB History        3    Para   3    Term   3       0    AB   0    Living   2       SAB   0    TAB   0    Ectopic   0    Multiple   0    Live Births   2                    Past Medical History:   Diagnosis Date    Chlamydia     Cholelithiases     OR today    Constipation     Gallstones     Gestational diabetes     Varicella        Review of Systems  Constitutional :no fever, feels well, no tiredness, no recent weight gain or loss  ENT: no ear ache, no loss of hearing, no nosebleeds or nasal discharge, no sore throat or hoarseness  Cardiovascular: no complaints of slow or fast heart beat, no chest pain, no palpitations, no leg claudication or lower extremity edema  Respiratory: no complaints of shortness of shortness of breath  Breasts:no complaints of breast pain, breast lump, or nipple discharge  Gastrointestinal: no complaints of abdominal pain, constipation,nausea, vomiting, or diarrhea   Genitourinary : no complaints of dysuria, incontinence, pelvic pain, no dysmenorrhea, vaginal discharge or abnormal vaginal bleeding and as noted in HPI  Objective    /64   Wt 78 5 kg (173 lb)   Breastfeeding?  Yes   BMI 31 64 kg/m²     General:   alert and oriented, in no acute distress   Vulva: normal   Vagina: normal mucosa, normal discharge   Cervix: multiparous appearance, no lesions and IUD strings 3cm long

## 2020-01-02 ENCOUNTER — OFFICE VISIT (OUTPATIENT)
Dept: OBGYN CLINIC | Facility: CLINIC | Age: 30
End: 2020-01-02
Payer: COMMERCIAL

## 2020-01-02 VITALS — WEIGHT: 189 LBS | DIASTOLIC BLOOD PRESSURE: 64 MMHG | BODY MASS INDEX: 34.57 KG/M2 | SYSTOLIC BLOOD PRESSURE: 122 MMHG

## 2020-01-02 DIAGNOSIS — Z30.431 IUD CHECK UP: Primary | ICD-10-CM

## 2020-01-02 DIAGNOSIS — N89.8 VAGINAL DISCHARGE: ICD-10-CM

## 2020-01-02 PROCEDURE — 99213 OFFICE O/P EST LOW 20 MIN: CPT | Performed by: OBSTETRICS & GYNECOLOGY

## 2020-01-12 PROBLEM — O24.414 GESTATIONAL DIABETES MELLITUS IN PREGNANCY, INSULIN CONTROLLED: Status: RESOLVED | Noted: 2018-09-28 | Resolved: 2020-01-12

## 2020-01-12 PROBLEM — Z28.39 RUBELLA NON-IMMUNE STATUS, ANTEPARTUM: Status: RESOLVED | Noted: 2018-06-18 | Resolved: 2020-01-12

## 2020-01-12 PROBLEM — Z87.59 HISTORY OF SHOULDER DYSTOCIA IN PRIOR PREGNANCY: Status: RESOLVED | Noted: 2018-11-01 | Resolved: 2020-01-12

## 2020-01-12 PROBLEM — O09.899 RUBELLA NON-IMMUNE STATUS, ANTEPARTUM: Status: RESOLVED | Noted: 2018-06-18 | Resolved: 2020-01-12

## 2020-01-13 NOTE — PROGRESS NOTES
Assessment/Plan:   Diagnoses and all orders for this visit:    IUD check up  - reassured patient that IUD strings are visible at external cervical os and correctly placed  Vaginal discharge  - no sign of infection on wet mount  - patient reassured that infection is not present  Follow up for annual exam    Other orders  -     Levonorgestrel (LILETTA, 52 MG,) 19 5 MCG/DAY IUD; by Intrauterine route        Subjective:    Patient ID: Angelo Bailey is a 30 y o  female  Chief Complaint   Patient presents with    Contraception     pt here for IUD [place] Karl[place] IUD inserted 01/15/19 with Dr Yenifer Echeverria  pt has no complaints        Patient is a 25yo [id number] presenting for a problem visit  Patient has Gearldine Specking IUD in place after insertion on 1/42/5136 without complicaiton  She is requesting an IUD check and reports vaginal discharge and vaginal odor  She denies attempts to check her IUD strings  She denies pain with intercourse and denies heavy vaginal bleeding and abdominal cramping  She does report some anxiety that her IUD will become displaced or infected  The following portions of the patient's history were reviewed and updated as appropriate: allergies, current medications, past family history, past medical history, past social history, past surgical history and problem list     Review of Systems   Constitutional: Negative for chills, diaphoresis and fever  Respiratory: Negative for shortness of breath  Gastrointestinal: Negative for abdominal pain, constipation, diarrhea, nausea and vomiting  Genitourinary: Positive for vaginal discharge  Negative for difficulty urinating, dyspareunia, dysuria, menstrual problem, pelvic pain, vaginal bleeding and vaginal pain  Musculoskeletal: Negative for back pain  Neurological: Negative for weakness, light-headedness and headaches  Psychiatric/Behavioral: Negative            Objective:  /64   Wt 85 7 kg (189 lb)   LMP  (Approximate) Comment: n/a absent with Steph Hy IUD   Breastfeeding? Yes   BMI 34 57 kg/m²   Physical Exam   Constitutional: She is oriented to person, place, and time  She appears well-developed and well-nourished  No distress  Genitourinary: Vagina normal and uterus normal  Vagina exhibits rugosity  Vagina exhibits no lesion  No erythema, tenderness or bleeding in the vagina  No vaginal discharge found  Right adnexum does not display mass, does not display tenderness and does not display fullness  Left adnexum does not display mass, does not display tenderness and does not display fullness  Cervix exhibits pinkness and visible IUD strings  Cervix does not exhibit motion tenderness, lesion, discharge, friability or polyp  Uterus is mobile and anteverted  Uterus is not enlarged, tender or exhibiting a mass  Genitourinary Comments: Microscopic wet-mount exam shows negative for pathogens, normal epithelial cells  HENT:   Head: Normocephalic and atraumatic  Cardiovascular: Normal rate  Pulmonary/Chest: Effort normal  No respiratory distress  Abdominal: Soft  She exhibits no mass  There is no tenderness  There is no guarding  Musculoskeletal: Normal range of motion  Neurological: She is alert and oriented to person, place, and time  Skin: Skin is warm and dry  She is not diaphoretic  Psychiatric: She has a normal mood and affect  Her behavior is normal  Judgment and thought content normal    Nursing note and vitals reviewed

## 2020-09-14 ENCOUNTER — OFFICE VISIT (OUTPATIENT)
Dept: URGENT CARE | Facility: CLINIC | Age: 30
End: 2020-09-14
Payer: COMMERCIAL

## 2020-09-14 VITALS
SYSTOLIC BLOOD PRESSURE: 107 MMHG | HEART RATE: 72 BPM | BODY MASS INDEX: 33.13 KG/M2 | HEIGHT: 62 IN | DIASTOLIC BLOOD PRESSURE: 59 MMHG | OXYGEN SATURATION: 100 % | RESPIRATION RATE: 20 BRPM | TEMPERATURE: 98.1 F | WEIGHT: 180 LBS

## 2020-09-14 DIAGNOSIS — Z11.59 SPECIAL SCREENING EXAMINATION FOR UNSPECIFIED VIRAL DISEASE: Primary | ICD-10-CM

## 2020-09-14 PROCEDURE — U0003 INFECTIOUS AGENT DETECTION BY NUCLEIC ACID (DNA OR RNA); SEVERE ACUTE RESPIRATORY SYNDROME CORONAVIRUS 2 (SARS-COV-2) (CORONAVIRUS DISEASE [COVID-19]), AMPLIFIED PROBE TECHNIQUE, MAKING USE OF HIGH THROUGHPUT TECHNOLOGIES AS DESCRIBED BY CMS-2020-01-R: HCPCS | Performed by: FAMILY MEDICINE

## 2020-09-14 PROCEDURE — G0382 LEV 3 HOSP TYPE B ED VISIT: HCPCS | Performed by: FAMILY MEDICINE

## 2020-09-14 NOTE — PROGRESS NOTES
[facility]        NAME: Leann Andrews is a 30 y o  female  : 1990    MRN: [id number]  DATE: 2020  TIME: 12:21 PM    Assessment and Plan   Special screening examination for unspecified viral disease [Z11 59]  1  Special screening examination for unspecified viral disease  Novel Coronavirus (COVID-19), PCR [facility] - Office Collection     Asymptomatic  Swab obtained  Will obtain results via [address]  Patient Instructions     Follow up with PCP in 3-5 days  Proceed to  ER if symptoms worsen  Chief Complaint     Chief Complaint   Patient presents with    COVID-19     Traveled to [place], needs Covid test           History of Present Illness       29 yo F presents today desiring a COVID test   Recently travelled to [place] and wants to ensure that she is free of the infection  Only has a mild sore throat  Practiced safety measures while on her trip  No obvious sick contacts  Review of Systems   Review of Systems   Constitutional: Negative for chills and fever  HENT: Positive for sore throat  Negative for congestion and rhinorrhea  Respiratory: Negative for shortness of breath  Cardiovascular: Negative for chest pain  Gastrointestinal: Negative for abdominal pain and nausea  Neurological: Negative for dizziness and headaches           Current Medications       Current Outpatient Medications:     Levonorgestrel (LILETTA, 52 MG,) 19 5 MCG/DAY IUD, by Intrauterine route, Disp: , Rfl:     Current Allergies     Allergies as of 2020    (No Known Allergies)            The following portions of the patient's history were reviewed and updated as appropriate: allergies, current medications, past family history, past medical history, past social history, past surgical history and problem list      Past Medical History:   Diagnosis Date    Chlamydia     Cholelithiases     OR today    Constipation     Gallstones     Gestational diabetes     Obesity     Varicella        Past Surgical History:   Procedure Laterality Date    CHOLECYSTECTOMY      COLONOSCOPY N/A 3/18/2016    Procedure: COLONOSCOPY;  Surgeon: Judi Crowell MD;  Location: [facility]; Service:     GA LAP,CHOLECYSTECTOMY N/A 2/10/2016    Procedure: CHOLECYSTECTOMY LAPAROSCOPIC, Lysis of Adhesions;  Surgeon: Judi Crowell MD;  Location: [facility];  Service: General    WISDOM TOOTH EXTRACTION         Family History   Problem Relation Age of Onset    No Known Problems Mother     No Known Problems Father     No Known Problems Sister     No Known Problems Brother     No Known Problems Daughter     Cancer Maternal Grandmother     Breast cancer Maternal Grandmother     Diabetes Paternal Grandmother     Cancer Paternal Grandmother     Hypertension Paternal Grandmother     Breast cancer Paternal Grandmother     Breast cancer Maternal Aunt          Medications have been verified  Objective   /59 (Patient Position: Sitting)   Pulse 72   Temp 98 1 °F (36 7 °C) (Temporal)   Resp 20   Ht 5' 2" (1 575 m)   Wt 81 6 kg (180 lb)   SpO2 100%   BMI 32 92 kg/m²        Physical Exam     Physical Exam  Vitals signs and nursing note reviewed  Constitutional:       General: She is not in acute distress  Appearance: Normal appearance  She is normal weight  She is not ill-appearing or toxic-appearing  HENT:      Head: Normocephalic and atraumatic  Eyes:      General:         Right eye: No discharge  Left eye: No discharge  Conjunctiva/sclera: Conjunctivae normal    Cardiovascular:      Rate and Rhythm: Normal rate and regular rhythm  Pulmonary:      Effort: Pulmonary effort is normal  No respiratory distress  Breath sounds: No stridor  No wheezing  Skin:     General: Skin is warm  Findings: No erythema  Neurological:      General: No focal deficit present  Mental Status: She is alert and oriented to person, place, and time     Psychiatric: Mood and Affect: Mood normal          Behavior: Behavior normal          Thought Content:  Thought content normal          Judgment: Judgment normal

## 2020-09-15 LAB — SARS-COV-2 RNA SPEC QL NAA+PROBE: NOT DETECTED

## 2020-10-11 ENCOUNTER — OFFICE VISIT (OUTPATIENT)
Dept: URGENT CARE | Facility: CLINIC | Age: 30
End: 2020-10-11
Payer: COMMERCIAL

## 2020-10-11 VITALS
SYSTOLIC BLOOD PRESSURE: 109 MMHG | TEMPERATURE: 98.9 F | DIASTOLIC BLOOD PRESSURE: 59 MMHG | BODY MASS INDEX: 34.04 KG/M2 | HEART RATE: 77 BPM | HEIGHT: 62 IN | WEIGHT: 185 LBS | RESPIRATION RATE: 16 BRPM

## 2020-10-11 DIAGNOSIS — Z11.59 SPECIAL SCREENING EXAMINATION FOR VIRAL DISEASE: Primary | ICD-10-CM

## 2020-10-11 PROCEDURE — U0003 INFECTIOUS AGENT DETECTION BY NUCLEIC ACID (DNA OR RNA); SEVERE ACUTE RESPIRATORY SYNDROME CORONAVIRUS 2 (SARS-COV-2) (CORONAVIRUS DISEASE [COVID-19]), AMPLIFIED PROBE TECHNIQUE, MAKING USE OF HIGH THROUGHPUT TECHNOLOGIES AS DESCRIBED BY CMS-2020-01-R: HCPCS | Performed by: PHYSICIAN ASSISTANT

## 2020-10-11 PROCEDURE — G0382 LEV 3 HOSP TYPE B ED VISIT: HCPCS | Performed by: PHYSICIAN ASSISTANT

## 2020-10-12 LAB — SARS-COV-2 RNA SPEC QL NAA+PROBE: NOT DETECTED

## 2020-11-20 ENCOUNTER — ANNUAL EXAM (OUTPATIENT)
Dept: OBGYN CLINIC | Facility: CLINIC | Age: 30
End: 2020-11-20
Payer: COMMERCIAL

## 2020-11-20 VITALS
BODY MASS INDEX: 33.49 KG/M2 | HEIGHT: 62 IN | DIASTOLIC BLOOD PRESSURE: 70 MMHG | SYSTOLIC BLOOD PRESSURE: 124 MMHG | WEIGHT: 182 LBS

## 2020-11-20 DIAGNOSIS — Z97.5 IUD (INTRAUTERINE DEVICE) IN PLACE: ICD-10-CM

## 2020-11-20 DIAGNOSIS — Z01.419 ENCOUNTER FOR WELL WOMAN EXAM WITH ROUTINE GYNECOLOGICAL EXAM: Primary | ICD-10-CM

## 2020-11-20 PROCEDURE — 99395 PREV VISIT EST AGE 18-39: CPT | Performed by: OBSTETRICS & GYNECOLOGY

## 2022-08-28 NOTE — TELEPHONE ENCOUNTER
Note      Date:  18  RE: Nae Morrison  : 1990  Estimated Date of Delivery: 18  EGA: [id number]  OB/GYN: Isis Goldenr    Insulin controlled gestational diabetes ADDENDUM     Date Fasting Post-  breakfast Post-  lunch Post-  dinner Before bedtime Carbs Comments      120 101          89 98 120 109          106 98 120 109          97 107 120 126          103                    Current regimen:  1900 calorie GDM diet with 3 meals and 3 snacks  Self-monitoring blood glucose 4 times daily fasting and 2 hours post meals  Plan: ADDENDUM  Patient refused to come in for an insulin education appointment since she had given herself insulin with the last pregnancy & delivered   Begin 24 units Lantus at bedtime  Reviewed insulin injection tips via phone  Prescriptions were sent to her pharmacy  HbA1c & CMP prescriptions were emailed to pt  Continue diet and for now change bedtime snack to only 2-3 oz protein and no CHO foods  Examples were provided to the patient  Continue monitoring  Date due to report next:  Tuesday, 10/2/18        Aiden Shelby MS, RD, CDE  Diabetes Educator   Diabetes [facility] Pregnancy [facility]             I have reviewed the documentation, assessment, and plan from the diabetes educator and agree with the plan as outlined in this note  Reed Leiva MD  Attending, Maternal-Fetal Medicine
no

## 2023-07-16 NOTE — TELEPHONE ENCOUNTER
Spoke with patient reviewed she can start ASA 81mg  There is no 100% reason for her to start, but it is not going to cause any harm and may be helpful  Verbalized understanding  122
